# Patient Record
Sex: MALE | Race: WHITE | NOT HISPANIC OR LATINO | Employment: OTHER | URBAN - METROPOLITAN AREA
[De-identification: names, ages, dates, MRNs, and addresses within clinical notes are randomized per-mention and may not be internally consistent; named-entity substitution may affect disease eponyms.]

---

## 2023-05-18 LAB
ALANINE AMINOTRANSFERASE (SGPT) (U/L) IN SER/PLAS: 16 U/L (ref 10–52)
ALBUMIN (G/DL) IN SER/PLAS: 5 G/DL (ref 3.4–5)
ALBUMIN (MG/L) IN URINE: 81.4 MG/L
ALBUMIN/CREATININE (UG/MG) IN URINE: 147.5 UG/MG CRT (ref 0–30)
ALKALINE PHOSPHATASE (U/L) IN SER/PLAS: 63 U/L (ref 33–136)
ANION GAP IN SER/PLAS: 15 MMOL/L (ref 10–20)
ASPARTATE AMINOTRANSFERASE (SGOT) (U/L) IN SER/PLAS: 14 U/L (ref 9–39)
BILIRUBIN TOTAL (MG/DL) IN SER/PLAS: 0.5 MG/DL (ref 0–1.2)
CALCIUM (MG/DL) IN SER/PLAS: 10 MG/DL (ref 8.6–10.3)
CARBON DIOXIDE, TOTAL (MMOL/L) IN SER/PLAS: 26 MMOL/L (ref 21–32)
CHLORIDE (MMOL/L) IN SER/PLAS: 98 MMOL/L (ref 98–107)
CHOLESTEROL (MG/DL) IN SER/PLAS: 115 MG/DL (ref 0–199)
CHOLESTEROL IN HDL (MG/DL) IN SER/PLAS: 31.1 MG/DL
CHOLESTEROL/HDL RATIO: 3.7
CREATININE (MG/DL) IN SER/PLAS: 1.12 MG/DL (ref 0.5–1.3)
CREATININE (MG/DL) IN URINE: 55.2 MG/DL (ref 20–370)
ESTIMATED AVERAGE GLUCOSE FOR HBA1C: 226 MG/DL
GFR MALE: 68 ML/MIN/1.73M2
GLUCOSE (MG/DL) IN SER/PLAS: 262 MG/DL (ref 74–99)
HEMOGLOBIN A1C/HEMOGLOBIN TOTAL IN BLOOD: 9.5 %
LDL: 45 MG/DL (ref 0–99)
POTASSIUM (MMOL/L) IN SER/PLAS: 4.6 MMOL/L (ref 3.5–5.3)
PROSTATE SPECIFIC AG (NG/ML) IN SER/PLAS: 0.32 NG/ML (ref 0–4)
PROTEIN TOTAL: 7.8 G/DL (ref 6.4–8.2)
SODIUM (MMOL/L) IN SER/PLAS: 134 MMOL/L (ref 136–145)
TRIGLYCERIDE (MG/DL) IN SER/PLAS: 195 MG/DL (ref 0–149)
UREA NITROGEN (MG/DL) IN SER/PLAS: 28 MG/DL (ref 6–23)
VLDL: 39 MG/DL (ref 0–40)

## 2023-06-30 ENCOUNTER — OFFICE VISIT (OUTPATIENT)
Dept: PRIMARY CARE | Facility: CLINIC | Age: 78
End: 2023-06-30
Payer: MEDICARE

## 2023-06-30 VITALS
BODY MASS INDEX: 24.62 KG/M2 | WEIGHT: 172 LBS | OXYGEN SATURATION: 98 % | DIASTOLIC BLOOD PRESSURE: 72 MMHG | RESPIRATION RATE: 16 BRPM | HEIGHT: 70 IN | HEART RATE: 88 BPM | TEMPERATURE: 98.2 F | SYSTOLIC BLOOD PRESSURE: 140 MMHG

## 2023-06-30 DIAGNOSIS — I71.21 ANEURYSM OF ASCENDING AORTA WITHOUT RUPTURE (CMS-HCC): ICD-10-CM

## 2023-06-30 DIAGNOSIS — E55.9 VITAMIN D DEFICIENCY: ICD-10-CM

## 2023-06-30 DIAGNOSIS — Z00.00 HEALTHCARE MAINTENANCE: ICD-10-CM

## 2023-06-30 DIAGNOSIS — M79.605 LEG PAIN, CENTRAL, LEFT: ICD-10-CM

## 2023-06-30 DIAGNOSIS — E11.9 TYPE 2 DIABETES MELLITUS WITHOUT COMPLICATION, WITHOUT LONG-TERM CURRENT USE OF INSULIN (MULTI): ICD-10-CM

## 2023-06-30 DIAGNOSIS — E78.2 MIXED HYPERLIPIDEMIA: ICD-10-CM

## 2023-06-30 DIAGNOSIS — I10 PRIMARY HYPERTENSION: Primary | ICD-10-CM

## 2023-06-30 DIAGNOSIS — R63.4 WEIGHT LOSS: ICD-10-CM

## 2023-06-30 PROBLEM — J01.90 ACUTE SINUSITIS: Status: ACTIVE | Noted: 2023-06-30

## 2023-06-30 PROBLEM — S61.218A LACERATION OF FINGER, INDEX: Status: ACTIVE | Noted: 2023-06-30

## 2023-06-30 PROBLEM — J20.9 ACUTE BRONCHITIS: Status: ACTIVE | Noted: 2023-06-30

## 2023-06-30 PROBLEM — L57.0 ACTINIC KERATOSES: Status: ACTIVE | Noted: 2023-06-30

## 2023-06-30 PROBLEM — I25.2 STATUS POST MYOCARDIAL INFARCTION: Status: ACTIVE | Noted: 2023-06-30

## 2023-06-30 PROBLEM — R61 NIGHT SWEATS: Status: RESOLVED | Noted: 2023-06-30 | Resolved: 2023-06-30

## 2023-06-30 PROBLEM — J30.9 ALLERGIC RHINITIS: Status: ACTIVE | Noted: 2023-06-30

## 2023-06-30 PROBLEM — M79.674 CHRONIC PAIN OF TOE OF RIGHT FOOT: Status: RESOLVED | Noted: 2023-06-30 | Resolved: 2023-06-30

## 2023-06-30 PROBLEM — Z20.822 SUSPECTED COVID-19 VIRUS INFECTION: Status: RESOLVED | Noted: 2023-06-30 | Resolved: 2023-06-30

## 2023-06-30 PROBLEM — Z20.822 SUSPECTED COVID-19 VIRUS INFECTION: Status: ACTIVE | Noted: 2023-06-30

## 2023-06-30 PROBLEM — H90.3 ASYMMETRIC SNHL (SENSORINEURAL HEARING LOSS): Status: RESOLVED | Noted: 2023-06-30 | Resolved: 2023-06-30

## 2023-06-30 PROBLEM — Z95.1 S/P CABG (CORONARY ARTERY BYPASS GRAFT): Status: ACTIVE | Noted: 2023-06-30

## 2023-06-30 PROBLEM — R05.9 COUGH: Status: RESOLVED | Noted: 2023-06-30 | Resolved: 2023-06-30

## 2023-06-30 PROBLEM — R53.83 FATIGUE: Status: RESOLVED | Noted: 2023-06-30 | Resolved: 2023-06-30

## 2023-06-30 PROBLEM — J01.90 ACUTE SINUSITIS: Status: RESOLVED | Noted: 2023-06-30 | Resolved: 2023-06-30

## 2023-06-30 PROBLEM — D18.00 ANGIOMA: Status: ACTIVE | Noted: 2023-06-30

## 2023-06-30 PROBLEM — Z85.828 HISTORY OF SCC (SQUAMOUS CELL CARCINOMA) OF SKIN: Status: ACTIVE | Noted: 2023-06-30

## 2023-06-30 PROBLEM — R05.9 COUGH: Status: ACTIVE | Noted: 2023-06-30

## 2023-06-30 PROBLEM — G89.29 CHRONIC PAIN OF TOE OF RIGHT FOOT: Status: ACTIVE | Noted: 2023-06-30

## 2023-06-30 PROBLEM — G89.29 CHRONIC PAIN OF TOE OF LEFT FOOT: Status: ACTIVE | Noted: 2023-06-30

## 2023-06-30 PROBLEM — B35.3 TINEA PEDIS: Status: ACTIVE | Noted: 2023-06-30

## 2023-06-30 PROBLEM — M79.675 CHRONIC PAIN OF TOE OF LEFT FOOT: Status: RESOLVED | Noted: 2023-06-30 | Resolved: 2023-06-30

## 2023-06-30 PROBLEM — R06.2 WHEEZING ON AUSCULTATION: Status: RESOLVED | Noted: 2023-06-30 | Resolved: 2023-06-30

## 2023-06-30 PROBLEM — R00.0 TACHYCARDIA: Status: RESOLVED | Noted: 2023-06-30 | Resolved: 2023-06-30

## 2023-06-30 PROBLEM — R61 NIGHT SWEATS: Status: ACTIVE | Noted: 2023-06-30

## 2023-06-30 PROBLEM — D64.9 ANEMIA: Status: ACTIVE | Noted: 2023-06-30

## 2023-06-30 PROBLEM — B35.1 NAIL FUNGUS: Status: ACTIVE | Noted: 2023-06-30

## 2023-06-30 PROBLEM — E87.1 HYPONATREMIA: Status: ACTIVE | Noted: 2023-06-30

## 2023-06-30 PROBLEM — G89.29 CHRONIC PAIN OF TOE OF RIGHT FOOT: Status: RESOLVED | Noted: 2023-06-30 | Resolved: 2023-06-30

## 2023-06-30 PROBLEM — R00.0 TACHYCARDIA: Status: ACTIVE | Noted: 2023-06-30

## 2023-06-30 PROBLEM — I51.9 HEART DISEASE: Status: RESOLVED | Noted: 2023-06-30 | Resolved: 2023-06-30

## 2023-06-30 PROBLEM — R50.9 FEVER: Status: RESOLVED | Noted: 2023-06-30 | Resolved: 2023-06-30

## 2023-06-30 PROBLEM — R06.2 WHEEZING ON AUSCULTATION: Status: ACTIVE | Noted: 2023-06-30

## 2023-06-30 PROBLEM — M25.551 ACUTE PAIN OF RIGHT HIP: Status: RESOLVED | Noted: 2023-06-30 | Resolved: 2023-06-30

## 2023-06-30 PROBLEM — G89.29 CHRONIC PAIN OF TOE OF LEFT FOOT: Status: RESOLVED | Noted: 2023-06-30 | Resolved: 2023-06-30

## 2023-06-30 PROBLEM — S61.218A LACERATION OF FINGER, INDEX: Status: RESOLVED | Noted: 2023-06-30 | Resolved: 2023-06-30

## 2023-06-30 PROBLEM — R09.A2 FOREIGN BODY SENSATION IN THROAT: Status: ACTIVE | Noted: 2023-06-30

## 2023-06-30 PROBLEM — H90.3 BILATERAL SENSORINEURAL HEARING LOSS: Status: ACTIVE | Noted: 2023-06-30

## 2023-06-30 PROBLEM — D18.00 ANGIOMA: Status: RESOLVED | Noted: 2023-06-30 | Resolved: 2023-06-30

## 2023-06-30 PROBLEM — B35.1 NAIL FUNGUS: Status: RESOLVED | Noted: 2023-06-30 | Resolved: 2023-06-30

## 2023-06-30 PROBLEM — I25.10 CAD (CORONARY ARTERY DISEASE): Status: ACTIVE | Noted: 2023-06-30

## 2023-06-30 PROBLEM — R50.9 FEVER: Status: ACTIVE | Noted: 2023-06-30

## 2023-06-30 PROBLEM — R09.A2 FOREIGN BODY SENSATION IN THROAT: Status: RESOLVED | Noted: 2023-06-30 | Resolved: 2023-06-30

## 2023-06-30 PROBLEM — B36.8 TINEA INCOGNITO: Status: RESOLVED | Noted: 2023-06-30 | Resolved: 2023-06-30

## 2023-06-30 PROBLEM — M25.551 ACUTE PAIN OF RIGHT HIP: Status: ACTIVE | Noted: 2023-06-30

## 2023-06-30 PROBLEM — B35.9 DERMATOPHYTOSIS: Status: RESOLVED | Noted: 2023-06-30 | Resolved: 2023-06-30

## 2023-06-30 PROBLEM — M79.675 CHRONIC PAIN OF TOE OF LEFT FOOT: Status: ACTIVE | Noted: 2023-06-30

## 2023-06-30 PROBLEM — R21 RASH: Status: ACTIVE | Noted: 2023-06-30

## 2023-06-30 PROBLEM — B35.3 TINEA PEDIS: Status: RESOLVED | Noted: 2023-06-30 | Resolved: 2023-06-30

## 2023-06-30 PROBLEM — E87.1 HYPONATREMIA: Status: RESOLVED | Noted: 2023-06-30 | Resolved: 2023-06-30

## 2023-06-30 PROBLEM — R97.20 ELEVATED PSA: Status: RESOLVED | Noted: 2023-06-30 | Resolved: 2023-06-30

## 2023-06-30 PROBLEM — H90.3 ASYMMETRIC SNHL (SENSORINEURAL HEARING LOSS): Status: ACTIVE | Noted: 2023-06-30

## 2023-06-30 PROBLEM — L72.0 EPIDERMAL CYST: Status: ACTIVE | Noted: 2023-06-30

## 2023-06-30 PROBLEM — E78.5 HYPERLIPIDEMIA: Status: ACTIVE | Noted: 2023-06-30

## 2023-06-30 PROBLEM — L72.0 EPIDERMAL CYST: Status: RESOLVED | Noted: 2023-06-30 | Resolved: 2023-06-30

## 2023-06-30 PROBLEM — J20.9 ACUTE BRONCHITIS: Status: RESOLVED | Noted: 2023-06-30 | Resolved: 2023-06-30

## 2023-06-30 PROBLEM — R21 RASH: Status: RESOLVED | Noted: 2023-06-30 | Resolved: 2023-06-30

## 2023-06-30 PROBLEM — B36.8 TINEA INCOGNITO: Status: ACTIVE | Noted: 2023-06-30

## 2023-06-30 PROBLEM — R53.83 FATIGUE: Status: ACTIVE | Noted: 2023-06-30

## 2023-06-30 PROBLEM — I51.9 HEART DISEASE: Status: ACTIVE | Noted: 2023-06-30

## 2023-06-30 PROBLEM — R97.20 ELEVATED PSA: Status: ACTIVE | Noted: 2023-06-30

## 2023-06-30 PROBLEM — B35.9 DERMATOPHYTOSIS: Status: ACTIVE | Noted: 2023-06-30

## 2023-06-30 PROBLEM — L57.0 ACTINIC KERATOSES: Status: RESOLVED | Noted: 2023-06-30 | Resolved: 2023-06-30

## 2023-06-30 PROBLEM — M79.609 PAIN IN LIMB: Status: ACTIVE | Noted: 2023-06-30

## 2023-06-30 PROBLEM — M79.674 CHRONIC PAIN OF TOE OF RIGHT FOOT: Status: ACTIVE | Noted: 2023-06-30

## 2023-06-30 PROBLEM — J90 BILATERAL PLEURAL EFFUSION: Status: ACTIVE | Noted: 2023-06-30

## 2023-06-30 PROBLEM — M79.609 PAIN IN LIMB: Status: RESOLVED | Noted: 2023-06-30 | Resolved: 2023-06-30

## 2023-06-30 PROCEDURE — 3077F SYST BP >= 140 MM HG: CPT | Performed by: INTERNAL MEDICINE

## 2023-06-30 PROCEDURE — 1160F RVW MEDS BY RX/DR IN RCRD: CPT | Performed by: INTERNAL MEDICINE

## 2023-06-30 PROCEDURE — 99214 OFFICE O/P EST MOD 30 MIN: CPT | Performed by: INTERNAL MEDICINE

## 2023-06-30 PROCEDURE — 3078F DIAST BP <80 MM HG: CPT | Performed by: INTERNAL MEDICINE

## 2023-06-30 PROCEDURE — 1036F TOBACCO NON-USER: CPT | Performed by: INTERNAL MEDICINE

## 2023-06-30 PROCEDURE — 1159F MED LIST DOCD IN RCRD: CPT | Performed by: INTERNAL MEDICINE

## 2023-06-30 PROCEDURE — 1170F FXNL STATUS ASSESSED: CPT | Performed by: INTERNAL MEDICINE

## 2023-06-30 PROCEDURE — G0439 PPPS, SUBSEQ VISIT: HCPCS | Performed by: INTERNAL MEDICINE

## 2023-06-30 RX ORDER — ASPIRIN 81 MG/1
1 TABLET ORAL DAILY
COMMUNITY
Start: 2019-08-21

## 2023-06-30 RX ORDER — CHOLECALCIFEROL (VITAMIN D3) 25 MCG
1 TABLET ORAL DAILY
COMMUNITY
Start: 2019-08-21

## 2023-06-30 RX ORDER — ATORVASTATIN CALCIUM 10 MG/1
1 TABLET, FILM COATED ORAL DAILY
COMMUNITY
Start: 2019-08-21 | End: 2024-04-24

## 2023-06-30 RX ORDER — SITAGLIPTIN 100 MG/1
1 TABLET, FILM COATED ORAL DAILY
COMMUNITY
Start: 2019-08-21 | End: 2023-07-25 | Stop reason: SDUPTHER

## 2023-06-30 RX ORDER — METOPROLOL TARTRATE 25 MG/1
1 TABLET, FILM COATED ORAL 2 TIMES DAILY
COMMUNITY
Start: 2019-08-21

## 2023-06-30 RX ORDER — LANCETS 33 GAUGE
EACH MISCELLANEOUS
COMMUNITY
Start: 2023-01-13 | End: 2023-10-26 | Stop reason: SDUPTHER

## 2023-06-30 RX ORDER — GLIMEPIRIDE 1 MG/1
TABLET ORAL
COMMUNITY
Start: 2019-08-21 | End: 2023-12-06 | Stop reason: SDUPTHER

## 2023-06-30 RX ORDER — METFORMIN HYDROCHLORIDE 1000 MG/1
TABLET ORAL
COMMUNITY
Start: 2019-08-21 | End: 2023-07-25 | Stop reason: SDUPTHER

## 2023-06-30 RX ORDER — ALBUTEROL SULFATE 90 UG/1
AEROSOL, METERED RESPIRATORY (INHALATION)
COMMUNITY
Start: 2020-08-18 | End: 2023-12-06 | Stop reason: SDUPTHER

## 2023-06-30 RX ORDER — BLOOD-GLUCOSE METER
EACH MISCELLANEOUS
COMMUNITY
Start: 2020-04-09 | End: 2023-10-26 | Stop reason: SDUPTHER

## 2023-06-30 RX ORDER — LISINOPRIL 10 MG/1
TABLET ORAL
COMMUNITY
Start: 2019-08-21

## 2023-06-30 RX ORDER — PHENOL 1.4 %
1 AEROSOL, SPRAY (ML) MUCOUS MEMBRANE DAILY
COMMUNITY
Start: 2019-08-21

## 2023-06-30 RX ORDER — FLUTICASONE PROPIONATE 50 MCG
1 SPRAY, SUSPENSION (ML) NASAL DAILY
COMMUNITY
Start: 2019-08-21 | End: 2024-02-15

## 2023-06-30 RX ORDER — TIOTROPIUM BROMIDE INHALATION SPRAY 3.12 UG/1
SPRAY, METERED RESPIRATORY (INHALATION)
COMMUNITY
Start: 2019-08-21 | End: 2024-03-07

## 2023-06-30 RX ORDER — LANOLIN ALCOHOL/MO/W.PET/CERES
1 CREAM (GRAM) TOPICAL DAILY
COMMUNITY
Start: 2019-08-21

## 2023-06-30 ASSESSMENT — ENCOUNTER SYMPTOMS
DEPRESSION: 0
LOSS OF SENSATION IN FEET: 0
FATIGUE: 0
SHORTNESS OF BREATH: 0
OCCASIONAL FEELINGS OF UNSTEADINESS: 0
COUGH: 0
FEVER: 0

## 2023-06-30 ASSESSMENT — PATIENT HEALTH QUESTIONNAIRE - PHQ9
1. LITTLE INTEREST OR PLEASURE IN DOING THINGS: NOT AT ALL
2. FEELING DOWN, DEPRESSED OR HOPELESS: NOT AT ALL
SUM OF ALL RESPONSES TO PHQ9 QUESTIONS 1 AND 2: 0

## 2023-06-30 ASSESSMENT — ACTIVITIES OF DAILY LIVING (ADL)
GROCERY_SHOPPING: INDEPENDENT
MANAGING_FINANCES: INDEPENDENT
DOING_HOUSEWORK: INDEPENDENT
TAKING_MEDICATION: INDEPENDENT
BATHING: INDEPENDENT
DRESSING: INDEPENDENT

## 2023-06-30 ASSESSMENT — PAIN SCALES - GENERAL: PAINLEVEL: 4

## 2023-06-30 NOTE — PROGRESS NOTES
"Subjective   Reason for Visit: Bob Aceves is an 78 y.o. male here for a Medicare Wellness visit.     Past Medical, Surgical, and Family History reviewed and updated in chart.    Reviewed all medications by prescribing practitioner or clinical pharmacist (such as prescriptions, OTCs, herbal therapies and supplements) and documented in the medical record.    HPI  Influenza 2022  Covid 2021, 2022  PCV13 2018  PPSV23 2014  Shingrix 2020, 2021  Tdap 2016  Cologuard 2022  Adv Dir yes  Psa  5/23  Bmi 25  Eye exam 23  Fall risk 23  Depression screen 23    Concerned about weight loss  Jardiance was added  No diet changes  Appetite is ok  Cut down to half pill      C/o LLE stiffness in AM.  Causes difficulty w/transfers and ambulation.  Exercises daily, \"works itself out.\"  Stiffness w/rest.    Patient Care Team:  Lina Beltrán DO as PCP - General  Lina Beltrán DO as PCP - [employer] PCP     Review of Systems   Constitutional:  Negative for fatigue and fever.   Respiratory:  Negative for cough and shortness of breath.    Cardiovascular:  Negative for chest pain and leg swelling.   All other systems reviewed and are negative.      Objective   Vitals:  /72   Pulse 88   Temp 36.8 °C (98.2 °F)   Resp 16   Ht 1.765 m (5' 9.5\")   Wt 78 kg (172 lb)   SpO2 98%   BMI 25.04 kg/m²       Physical Exam  Vitals and nursing note reviewed.   Constitutional:       Appearance: Normal appearance.   HENT:      Head: Normocephalic.   Eyes:      Conjunctiva/sclera: Conjunctivae normal.      Pupils: Pupils are equal, round, and reactive to light.   Cardiovascular:      Rate and Rhythm: Normal rate and regular rhythm.      Pulses: Normal pulses.      Heart sounds: Normal heart sounds.   Pulmonary:      Effort: Pulmonary effort is normal.      Breath sounds: Normal breath sounds. No stridor.   Musculoskeletal:         General: No swelling.      Cervical back: Neck supple.   Skin:     General: Skin is warm and " dry.   Neurological:      General: No focal deficit present.      Mental Status: He is oriented to person, place, and time.         Assessment/Plan   Problem List Items Addressed This Visit       Ascending aortic aneurysm (CMS/HCC)    Diabetes mellitus (CMS/HCC)    Relevant Orders    CBC    Comprehensive Metabolic Panel    C-Peptide    Hypertension - Primary    Hyperlipidemia    Vitamin D deficiency    Relevant Orders    Vitamin B12    Vitamin D, Total     Other Visit Diagnoses       Healthcare maintenance        Leg pain, central, left        Relevant Orders    Lower extremity venous duplex left    Weight loss        Relevant Orders    Thyroid Stimulating Hormone    Sedimentation Rate    C-reactive protein          Update preventive  Cont meds  Check labs  Stable based on symptoms and exam.  Continue established treatment plan and follow up at least yearly.  Monitor weight  1/2 jardiance  Check sugars at different times of day  Check cpeptide   May need to be started on insulin

## 2023-07-03 ENCOUNTER — LAB (OUTPATIENT)
Dept: LAB | Facility: LAB | Age: 78
End: 2023-07-03
Payer: MEDICARE

## 2023-07-03 DIAGNOSIS — R63.4 WEIGHT LOSS: ICD-10-CM

## 2023-07-03 DIAGNOSIS — E11.9 TYPE 2 DIABETES MELLITUS WITHOUT COMPLICATION, WITHOUT LONG-TERM CURRENT USE OF INSULIN (MULTI): ICD-10-CM

## 2023-07-03 DIAGNOSIS — E55.9 VITAMIN D DEFICIENCY: ICD-10-CM

## 2023-07-03 LAB — C PEPTIDE (NG/ML) IN SER/PLAS: 3.9 NG/ML (ref 0.7–3.9)

## 2023-07-03 PROCEDURE — 84443 ASSAY THYROID STIM HORMONE: CPT

## 2023-07-03 PROCEDURE — 85652 RBC SED RATE AUTOMATED: CPT

## 2023-07-03 PROCEDURE — 85027 COMPLETE CBC AUTOMATED: CPT

## 2023-07-03 PROCEDURE — 86140 C-REACTIVE PROTEIN: CPT

## 2023-07-03 PROCEDURE — 84681 ASSAY OF C-PEPTIDE: CPT

## 2023-07-03 PROCEDURE — 82306 VITAMIN D 25 HYDROXY: CPT

## 2023-07-03 PROCEDURE — 82607 VITAMIN B-12: CPT

## 2023-07-03 PROCEDURE — 36415 COLL VENOUS BLD VENIPUNCTURE: CPT

## 2023-07-03 PROCEDURE — 80053 COMPREHEN METABOLIC PANEL: CPT

## 2023-07-04 LAB
ALANINE AMINOTRANSFERASE (SGPT) (U/L) IN SER/PLAS: 15 U/L (ref 10–52)
ALBUMIN (G/DL) IN SER/PLAS: 4.7 G/DL (ref 3.4–5)
ALKALINE PHOSPHATASE (U/L) IN SER/PLAS: 62 U/L (ref 33–136)
ANION GAP IN SER/PLAS: 16 MMOL/L (ref 10–20)
ASPARTATE AMINOTRANSFERASE (SGOT) (U/L) IN SER/PLAS: 13 U/L (ref 9–39)
BILIRUBIN TOTAL (MG/DL) IN SER/PLAS: 0.6 MG/DL (ref 0–1.2)
C REACTIVE PROTEIN (MG/L) IN SER/PLAS: <0.1 MG/DL
CALCIDIOL (25 OH VITAMIN D3) (NG/ML) IN SER/PLAS: 64 NG/ML
CALCIUM (MG/DL) IN SER/PLAS: 9.9 MG/DL (ref 8.6–10.3)
CARBON DIOXIDE, TOTAL (MMOL/L) IN SER/PLAS: 25 MMOL/L (ref 21–32)
CHLORIDE (MMOL/L) IN SER/PLAS: 97 MMOL/L (ref 98–107)
COBALAMIN (VITAMIN B12) (PG/ML) IN SER/PLAS: 2796 PG/ML (ref 211–911)
CREATININE (MG/DL) IN SER/PLAS: 1.22 MG/DL (ref 0.5–1.3)
ERYTHROCYTE DISTRIBUTION WIDTH (RATIO) BY AUTOMATED COUNT: 12.8 % (ref 11.5–14.5)
ERYTHROCYTE MEAN CORPUSCULAR HEMOGLOBIN CONCENTRATION (G/DL) BY AUTOMATED: 34.2 G/DL (ref 32–36)
ERYTHROCYTE MEAN CORPUSCULAR VOLUME (FL) BY AUTOMATED COUNT: 96 FL (ref 80–100)
ERYTHROCYTES (10*6/UL) IN BLOOD BY AUTOMATED COUNT: 4.21 X10E12/L (ref 4.5–5.9)
GFR MALE: 61 ML/MIN/1.73M2
GLUCOSE (MG/DL) IN SER/PLAS: 218 MG/DL (ref 74–99)
HEMATOCRIT (%) IN BLOOD BY AUTOMATED COUNT: 40.4 % (ref 41–52)
HEMOGLOBIN (G/DL) IN BLOOD: 13.8 G/DL (ref 13.5–17.5)
LEUKOCYTES (10*3/UL) IN BLOOD BY AUTOMATED COUNT: 8 X10E9/L (ref 4.4–11.3)
PLATELETS (10*3/UL) IN BLOOD AUTOMATED COUNT: 182 X10E9/L (ref 150–450)
POTASSIUM (MMOL/L) IN SER/PLAS: 5 MMOL/L (ref 3.5–5.3)
PROTEIN TOTAL: 7 G/DL (ref 6.4–8.2)
SEDIMENTATION RATE, ERYTHROCYTE: 7 MM/H (ref 0–20)
SODIUM (MMOL/L) IN SER/PLAS: 133 MMOL/L (ref 136–145)
THYROTROPIN (MIU/L) IN SER/PLAS BY DETECTION LIMIT <= 0.05 MIU/L: 0.9 MIU/L (ref 0.44–3.98)
UREA NITROGEN (MG/DL) IN SER/PLAS: 27 MG/DL (ref 6–23)

## 2023-07-05 ENCOUNTER — TELEPHONE (OUTPATIENT)
Dept: PRIMARY CARE | Facility: CLINIC | Age: 78
End: 2023-07-05
Payer: MEDICARE

## 2023-07-07 ENCOUNTER — TELEPHONE (OUTPATIENT)
Dept: PRIMARY CARE | Facility: CLINIC | Age: 78
End: 2023-07-07
Payer: MEDICARE

## 2023-07-07 NOTE — RESULT ENCOUNTER NOTE
Call patient his labs look good  Sugars are high causing mild dehydration  Work on fluids  His pancreas is making insulin but  I am concerned if we can't get his numbers down that we   May need to add a little insulin because we dont want him to lose anymore weight  Will discuss in detain at his follow up   How have his numbers been doing  We can move up follow up in the the next month if needed if he is running very high

## 2023-07-07 NOTE — TELEPHONE ENCOUNTER
----- Message from Lina Beltrán, DO sent at 7/7/2023 11:41 AM EDT -----  Call patient his labs look good  Sugars are high causing mild dehydration  Work on fluids  His pancreas is making insulin but  I am concerned if we can't get his numbers down that we   May need to add a little insulin because we dont want him to lose anymore weight  Will discuss in detain at his follow up   How have his numbers been doing  We can move up follow up in the the next month if needed if he is running very high

## 2023-07-07 NOTE — TELEPHONE ENCOUNTER
Pt made aware and voiced understanding.  Stated glucose still high.  Running 150-200.  Has appt w/you 8/11/23.  Do you want to see him in the next few weeks?    Also,  pt questioned what the stiffness in his legs is r/t since venous duplex was negative. Please advise.

## 2023-07-20 ENCOUNTER — PATIENT OUTREACH (OUTPATIENT)
Dept: CARE COORDINATION | Facility: CLINIC | Age: 78
End: 2023-07-20
Payer: MEDICARE

## 2023-07-20 NOTE — PROGRESS NOTES
Left message on patient's phone with these details.     Hello. My name is Nickie. I am the Patient Navigator with [facility] that works with Dr. Beltrán's   office.     I am following up from your recent visit with your PCP to make sure you do not have any further questions or need any further assistance.    I am here to help guide you through our healthcare system and help with any obstacles that are in the way of you receiving the proper care. I am able to assist with appointments, medication coverage issues, community programs which can include help with meals, medical supplies, senior programs and housing issues.     We are here to help get you connected with the support you might need for your overall health. If you do my assistance or have questions please contact me at [phone]. This is my direct number and you can feel free to leave a message if I do not answer and I will call you back at my earliest convenience.     Contacting patient to Review [employer] Patient Experience Questionnaire.

## 2023-07-25 ENCOUNTER — OFFICE VISIT (OUTPATIENT)
Dept: PRIMARY CARE | Facility: CLINIC | Age: 78
End: 2023-07-25
Payer: MEDICARE

## 2023-07-25 VITALS
RESPIRATION RATE: 16 BRPM | HEIGHT: 70 IN | SYSTOLIC BLOOD PRESSURE: 120 MMHG | HEART RATE: 75 BPM | BODY MASS INDEX: 24.34 KG/M2 | OXYGEN SATURATION: 97 % | WEIGHT: 170 LBS | DIASTOLIC BLOOD PRESSURE: 68 MMHG | TEMPERATURE: 97.5 F

## 2023-07-25 DIAGNOSIS — E11.9 TYPE 2 DIABETES MELLITUS WITHOUT COMPLICATION, WITH LONG-TERM CURRENT USE OF INSULIN (MULTI): Primary | ICD-10-CM

## 2023-07-25 DIAGNOSIS — E11.9 TYPE 2 DIABETES MELLITUS WITHOUT COMPLICATION, UNSPECIFIED WHETHER LONG TERM INSULIN USE (MULTI): ICD-10-CM

## 2023-07-25 DIAGNOSIS — I10 PRIMARY HYPERTENSION: ICD-10-CM

## 2023-07-25 DIAGNOSIS — R63.4 WEIGHT LOSS: ICD-10-CM

## 2023-07-25 DIAGNOSIS — E78.2 MIXED HYPERLIPIDEMIA: ICD-10-CM

## 2023-07-25 DIAGNOSIS — M79.605 LEG PAIN, CENTRAL, LEFT: ICD-10-CM

## 2023-07-25 DIAGNOSIS — Z79.4 TYPE 2 DIABETES MELLITUS WITHOUT COMPLICATION, WITH LONG-TERM CURRENT USE OF INSULIN (MULTI): Primary | ICD-10-CM

## 2023-07-25 PROCEDURE — 3074F SYST BP LT 130 MM HG: CPT | Performed by: INTERNAL MEDICINE

## 2023-07-25 PROCEDURE — 99214 OFFICE O/P EST MOD 30 MIN: CPT | Performed by: INTERNAL MEDICINE

## 2023-07-25 PROCEDURE — 1160F RVW MEDS BY RX/DR IN RCRD: CPT | Performed by: INTERNAL MEDICINE

## 2023-07-25 PROCEDURE — 3078F DIAST BP <80 MM HG: CPT | Performed by: INTERNAL MEDICINE

## 2023-07-25 PROCEDURE — 1125F AMNT PAIN NOTED PAIN PRSNT: CPT | Performed by: INTERNAL MEDICINE

## 2023-07-25 PROCEDURE — 1036F TOBACCO NON-USER: CPT | Performed by: INTERNAL MEDICINE

## 2023-07-25 PROCEDURE — 1159F MED LIST DOCD IN RCRD: CPT | Performed by: INTERNAL MEDICINE

## 2023-07-25 RX ORDER — INSULIN GLARGINE 100 [IU]/ML
3 INJECTION, SOLUTION SUBCUTANEOUS NIGHTLY
Qty: 3 ML | Refills: 3 | Status: SHIPPED | OUTPATIENT
Start: 2023-07-25 | End: 2023-08-25 | Stop reason: SDUPTHER

## 2023-07-25 RX ORDER — METFORMIN HYDROCHLORIDE 1000 MG/1
TABLET ORAL
Qty: 180 TABLET | Refills: 3 | Status: SHIPPED | OUTPATIENT
Start: 2023-07-25 | End: 2024-04-23

## 2023-07-25 RX ORDER — PEN NEEDLE, DIABETIC 30 GX3/16"
NEEDLE, DISPOSABLE MISCELLANEOUS
Qty: 100 EACH | Refills: 11 | Status: SHIPPED | OUTPATIENT
Start: 2023-07-25 | End: 2024-07-24

## 2023-07-25 ASSESSMENT — ENCOUNTER SYMPTOMS
FEVER: 0
SHORTNESS OF BREATH: 0
FATIGUE: 0
COUGH: 0

## 2023-07-25 ASSESSMENT — PATIENT HEALTH QUESTIONNAIRE - PHQ9
2. FEELING DOWN, DEPRESSED OR HOPELESS: NOT AT ALL
SUM OF ALL RESPONSES TO PHQ9 QUESTIONS 1 AND 2: 0
1. LITTLE INTEREST OR PLEASURE IN DOING THINGS: NOT AT ALL

## 2023-07-25 NOTE — PROGRESS NOTES
"Subjective   Bob Aceves is a 78 y.o. male who presents for 6 week Diabetes follow up.    HPI   Monitoring glucose daily.  Alternating times.  Ave: 150-200  Limiting sugars and carbohydrates.  Denies adverse sx's r/t DM.  Continues w/soreness to LLE in AM, improves w/movement/exercise.    Review of Systems   Constitutional:  Negative for fatigue and fever.   Respiratory:  Negative for cough and shortness of breath.    Cardiovascular:  Negative for chest pain and leg swelling.   All other systems reviewed and are negative.      Health Maintenance Due   Topic Date Due    Diabetes: Foot Exam  Never done    Diabetes: Retinopathy Screening  Never done    Hepatitis C Screening  Never done    COVID-19 Vaccine (4 - Booster for Pfizer series) 11/08/2022    Diabetes: Hemoglobin A1C  08/18/2023       Objective   /68   Pulse 75   Temp 36.4 °C (97.5 °F)   Resp 16   Ht 1.765 m (5' 9.5\")   Wt 77.1 kg (170 lb)   SpO2 97%   BMI 24.74 kg/m²     Physical Exam  Vitals and nursing note reviewed.   Constitutional:       Appearance: Normal appearance.   HENT:      Head: Normocephalic.   Eyes:      Conjunctiva/sclera: Conjunctivae normal.      Pupils: Pupils are equal, round, and reactive to light.   Cardiovascular:      Rate and Rhythm: Normal rate and regular rhythm.      Pulses: Normal pulses.      Heart sounds: Normal heart sounds.   Pulmonary:      Effort: Pulmonary effort is normal.      Breath sounds: Normal breath sounds.   Musculoskeletal:         General: No swelling.      Cervical back: Neck supple.   Skin:     General: Skin is warm and dry.   Neurological:      General: No focal deficit present.      Mental Status: He is oriented to person, place, and time.         Assessment/Plan   Problem List Items Addressed This Visit       Diabetes mellitus (CMS/[facility]) - Primary    Relevant Medications    metFORMIN (Glucophage) 1,000 mg tablet    SITagliptin phosphate (Januvia) 100 mg tablet    insulin glargine (Lantus Solostar " "U-100 Insulin) 100 unit/mL (3 mL) pen    pen needle, diabetic 31 gauge x 5/16\" needle    Other Relevant Orders    Lipid Panel    CBC    Comprehensive Metabolic Panel    Hemoglobin A1C    Vitamin B12    Hypertension    Hyperlipidemia     Other Visit Diagnoses       Weight loss        Leg pain, central, left              Will start lantus at low dose  3 units   Daily   Call in 2 weeks with sugars for adjustments  Answered questions  Call if problems  Fu in 6 weeks  Monitor weight   Stable based on symptoms and exam.  Continue established treatment plan and follow up at least yearly.         "

## 2023-08-10 ENCOUNTER — TELEPHONE (OUTPATIENT)
Dept: PRIMARY CARE | Facility: CLINIC | Age: 78
End: 2023-08-10
Payer: MEDICARE

## 2023-08-11 ENCOUNTER — APPOINTMENT (OUTPATIENT)
Dept: PRIMARY CARE | Facility: CLINIC | Age: 78
End: 2023-08-11
Payer: MEDICARE

## 2023-08-11 NOTE — TELEPHONE ENCOUNTER
Pt made aware to increase insulin to 6 units daily and call back in 2 weeks w/update.  (See scanned doc).

## 2023-08-25 ENCOUNTER — TELEPHONE (OUTPATIENT)
Dept: PRIMARY CARE | Facility: CLINIC | Age: 78
End: 2023-08-25
Payer: MEDICARE

## 2023-08-25 DIAGNOSIS — E11.9 TYPE 2 DIABETES MELLITUS WITHOUT COMPLICATION, WITH LONG-TERM CURRENT USE OF INSULIN (MULTI): Primary | ICD-10-CM

## 2023-08-25 DIAGNOSIS — Z79.4 TYPE 2 DIABETES MELLITUS WITHOUT COMPLICATION, WITH LONG-TERM CURRENT USE OF INSULIN (MULTI): Primary | ICD-10-CM

## 2023-08-25 RX ORDER — INSULIN GLARGINE 100 [IU]/ML
6 INJECTION, SOLUTION SUBCUTANEOUS NIGHTLY
Qty: 1.8 ML | Refills: 11 | Status: SHIPPED | OUTPATIENT
Start: 2023-08-25 | End: 2024-08-24

## 2023-08-25 NOTE — TELEPHONE ENCOUNTER
Patient lm,     Insulin units were increased.     Needs a new pens. Running out.     Please update sig to 6 units

## 2023-08-29 ENCOUNTER — LAB (OUTPATIENT)
Dept: LAB | Facility: LAB | Age: 78
End: 2023-08-29
Payer: MEDICARE

## 2023-08-29 DIAGNOSIS — E11.9 TYPE 2 DIABETES MELLITUS WITHOUT COMPLICATION, UNSPECIFIED WHETHER LONG TERM INSULIN USE (MULTI): ICD-10-CM

## 2023-08-29 LAB
ALANINE AMINOTRANSFERASE (SGPT) (U/L) IN SER/PLAS: 13 U/L (ref 10–52)
ALBUMIN (G/DL) IN SER/PLAS: 4.6 G/DL (ref 3.4–5)
ALKALINE PHOSPHATASE (U/L) IN SER/PLAS: 61 U/L (ref 33–136)
ANION GAP IN SER/PLAS: 15 MMOL/L (ref 10–20)
ASPARTATE AMINOTRANSFERASE (SGOT) (U/L) IN SER/PLAS: 12 U/L (ref 9–39)
BILIRUBIN TOTAL (MG/DL) IN SER/PLAS: 0.5 MG/DL (ref 0–1.2)
CALCIUM (MG/DL) IN SER/PLAS: 9.9 MG/DL (ref 8.6–10.3)
CARBON DIOXIDE, TOTAL (MMOL/L) IN SER/PLAS: 27 MMOL/L (ref 21–32)
CHLORIDE (MMOL/L) IN SER/PLAS: 101 MMOL/L (ref 98–107)
CHOLESTEROL (MG/DL) IN SER/PLAS: 100 MG/DL (ref 0–199)
CHOLESTEROL IN HDL (MG/DL) IN SER/PLAS: 24.7 MG/DL
CHOLESTEROL/HDL RATIO: 4
COBALAMIN (VITAMIN B12) (PG/ML) IN SER/PLAS: 2394 PG/ML (ref 211–911)
CREATININE (MG/DL) IN SER/PLAS: 1.18 MG/DL (ref 0.5–1.3)
ERYTHROCYTE DISTRIBUTION WIDTH (RATIO) BY AUTOMATED COUNT: 12.6 % (ref 11.5–14.5)
ERYTHROCYTE MEAN CORPUSCULAR HEMOGLOBIN CONCENTRATION (G/DL) BY AUTOMATED: 33.6 G/DL (ref 32–36)
ERYTHROCYTE MEAN CORPUSCULAR VOLUME (FL) BY AUTOMATED COUNT: 97 FL (ref 80–100)
ERYTHROCYTES (10*6/UL) IN BLOOD BY AUTOMATED COUNT: 4.19 X10E12/L (ref 4.5–5.9)
ESTIMATED AVERAGE GLUCOSE FOR HBA1C: 232 MG/DL
GFR MALE: 64 ML/MIN/1.73M2
GLUCOSE (MG/DL) IN SER/PLAS: 155 MG/DL (ref 74–99)
HEMATOCRIT (%) IN BLOOD BY AUTOMATED COUNT: 40.5 % (ref 41–52)
HEMOGLOBIN (G/DL) IN BLOOD: 13.6 G/DL (ref 13.5–17.5)
HEMOGLOBIN A1C/HEMOGLOBIN TOTAL IN BLOOD: 9.7 %
LDL: 43 MG/DL (ref 0–99)
LEUKOCYTES (10*3/UL) IN BLOOD BY AUTOMATED COUNT: 7 X10E9/L (ref 4.4–11.3)
PLATELETS (10*3/UL) IN BLOOD AUTOMATED COUNT: 187 X10E9/L (ref 150–450)
POTASSIUM (MMOL/L) IN SER/PLAS: 4.6 MMOL/L (ref 3.5–5.3)
PROTEIN TOTAL: 6.9 G/DL (ref 6.4–8.2)
SODIUM (MMOL/L) IN SER/PLAS: 138 MMOL/L (ref 136–145)
TRIGLYCERIDE (MG/DL) IN SER/PLAS: 161 MG/DL (ref 0–149)
UREA NITROGEN (MG/DL) IN SER/PLAS: 33 MG/DL (ref 6–23)
VLDL: 32 MG/DL (ref 0–40)

## 2023-08-29 PROCEDURE — 83036 HEMOGLOBIN GLYCOSYLATED A1C: CPT

## 2023-08-29 PROCEDURE — 82607 VITAMIN B-12: CPT

## 2023-08-29 PROCEDURE — 85027 COMPLETE CBC AUTOMATED: CPT

## 2023-08-29 PROCEDURE — 80061 LIPID PANEL: CPT

## 2023-08-29 PROCEDURE — 36415 COLL VENOUS BLD VENIPUNCTURE: CPT

## 2023-08-29 PROCEDURE — 80053 COMPREHEN METABOLIC PANEL: CPT

## 2023-09-05 ENCOUNTER — OFFICE VISIT (OUTPATIENT)
Dept: PRIMARY CARE | Facility: CLINIC | Age: 78
End: 2023-09-05
Payer: MEDICARE

## 2023-09-05 VITALS
BODY MASS INDEX: 24.48 KG/M2 | DIASTOLIC BLOOD PRESSURE: 62 MMHG | HEIGHT: 70 IN | HEART RATE: 80 BPM | SYSTOLIC BLOOD PRESSURE: 108 MMHG | RESPIRATION RATE: 16 BRPM | TEMPERATURE: 98.1 F | OXYGEN SATURATION: 100 % | WEIGHT: 171 LBS

## 2023-09-05 DIAGNOSIS — E11.9 TYPE 2 DIABETES MELLITUS WITHOUT COMPLICATION, UNSPECIFIED WHETHER LONG TERM INSULIN USE (MULTI): Primary | ICD-10-CM

## 2023-09-05 DIAGNOSIS — E78.2 MIXED HYPERLIPIDEMIA: ICD-10-CM

## 2023-09-05 PROBLEM — G89.29 CHRONIC PAIN OF TOE: Status: ACTIVE | Noted: 2023-09-05

## 2023-09-05 PROBLEM — L72.0 EPIDERMAL CYST: Status: ACTIVE | Noted: 2023-09-05

## 2023-09-05 PROBLEM — H90.3 ASYMMETRIC SNHL (SENSORINEURAL HEARING LOSS): Status: ACTIVE | Noted: 2023-09-05

## 2023-09-05 PROBLEM — R09.A2 FOREIGN BODY SENSATION IN THROAT: Status: ACTIVE | Noted: 2023-09-05

## 2023-09-05 PROBLEM — D18.00 ANGIOMA: Status: ACTIVE | Noted: 2023-09-05

## 2023-09-05 PROBLEM — M79.676 CHRONIC PAIN OF TOE: Status: ACTIVE | Noted: 2023-09-05

## 2023-09-05 PROBLEM — B35.9 DERMATOPHYTOSIS: Status: ACTIVE | Noted: 2023-09-05

## 2023-09-05 PROBLEM — R61 NIGHT SWEATS: Status: ACTIVE | Noted: 2023-09-05

## 2023-09-05 PROBLEM — B36.8 TINEA INCOGNITO: Status: ACTIVE | Noted: 2023-09-05

## 2023-09-05 PROBLEM — L57.0 ACTINIC KERATOSES: Status: ACTIVE | Noted: 2023-09-05

## 2023-09-05 PROBLEM — I51.9 HEART DISEASE: Status: ACTIVE | Noted: 2023-09-05

## 2023-09-05 PROBLEM — R06.2 WHEEZING ON AUSCULTATION: Status: ACTIVE | Noted: 2023-09-05

## 2023-09-05 PROBLEM — B35.1 NAIL FUNGUS: Status: ACTIVE | Noted: 2023-09-05

## 2023-09-05 PROBLEM — E87.1 HYPONATREMIA: Status: ACTIVE | Noted: 2023-09-05

## 2023-09-05 PROBLEM — S61.218A LACERATION OF FINGER, INDEX: Status: ACTIVE | Noted: 2023-09-05

## 2023-09-05 PROBLEM — R53.83 FATIGUE: Status: ACTIVE | Noted: 2023-09-05

## 2023-09-05 PROBLEM — R00.0 TACHYCARDIA: Status: ACTIVE | Noted: 2023-09-05

## 2023-09-05 PROBLEM — B35.3 TINEA PEDIS: Status: ACTIVE | Noted: 2023-09-05

## 2023-09-05 PROBLEM — R97.20 ELEVATED PSA: Status: ACTIVE | Noted: 2023-09-05

## 2023-09-05 PROBLEM — M79.609 PAIN IN LIMB: Status: ACTIVE | Noted: 2023-09-05

## 2023-09-05 PROCEDURE — 1036F TOBACCO NON-USER: CPT | Performed by: INTERNAL MEDICINE

## 2023-09-05 PROCEDURE — 1125F AMNT PAIN NOTED PAIN PRSNT: CPT | Performed by: INTERNAL MEDICINE

## 2023-09-05 PROCEDURE — 3074F SYST BP LT 130 MM HG: CPT | Performed by: INTERNAL MEDICINE

## 2023-09-05 PROCEDURE — 99214 OFFICE O/P EST MOD 30 MIN: CPT | Performed by: INTERNAL MEDICINE

## 2023-09-05 PROCEDURE — 1160F RVW MEDS BY RX/DR IN RCRD: CPT | Performed by: INTERNAL MEDICINE

## 2023-09-05 PROCEDURE — 3078F DIAST BP <80 MM HG: CPT | Performed by: INTERNAL MEDICINE

## 2023-09-05 PROCEDURE — 1159F MED LIST DOCD IN RCRD: CPT | Performed by: INTERNAL MEDICINE

## 2023-09-05 RX ORDER — GLUCOSAM/CHONDRO/HERB 149/HYAL 750-100 MG
1 TABLET ORAL DAILY
COMMUNITY
End: 2023-12-06 | Stop reason: ALTCHOICE

## 2023-09-05 RX ORDER — IBUPROFEN 100 MG/5ML
1 SUSPENSION, ORAL (FINAL DOSE FORM) ORAL DAILY
COMMUNITY

## 2023-09-05 ASSESSMENT — ENCOUNTER SYMPTOMS
SHORTNESS OF BREATH: 0
FEVER: 0
COUGH: 0
FATIGUE: 0

## 2023-09-05 NOTE — PROGRESS NOTES
"Subjective   Bob Aceves is a 78 y.o. male who presents for Diabetes follow up.    HPI   Monitoring glucose TID.  Ave: 130-170.  C/o neuropathy pain to LLE.  Denies polyuria, polydipsia.  Follows Diabetic diet.  Stays active.  Taking meds as Rx'd.    Review of Systems   Constitutional:  Negative for fatigue and fever.   Respiratory:  Negative for cough and shortness of breath.    Cardiovascular:  Negative for chest pain and leg swelling.   All other systems reviewed and are negative.      Health Maintenance Due   Topic Date Due    Diabetes: Foot Exam  Never done    Diabetes: Retinopathy Screening  Never done    Hepatitis C Screening  Never done    COVID-19 Vaccine (4 - Pfizer series) 11/08/2022    Influenza Vaccine (1) 09/01/2023       Objective   /62   Pulse 80   Temp 36.7 °C (98.1 °F)   Resp 16   Ht 1.765 m (5' 9.5\")   Wt 77.6 kg (171 lb)   SpO2 100%   BMI 24.89 kg/m²     Physical Exam  Vitals and nursing note reviewed.   Constitutional:       Appearance: Normal appearance.   HENT:      Head: Normocephalic.   Eyes:      Conjunctiva/sclera: Conjunctivae normal.      Pupils: Pupils are equal, round, and reactive to light.   Cardiovascular:      Rate and Rhythm: Normal rate and regular rhythm.      Pulses: Normal pulses.      Heart sounds: Normal heart sounds.   Pulmonary:      Effort: Pulmonary effort is normal.      Breath sounds: Normal breath sounds.   Musculoskeletal:         General: No swelling.      Cervical back: Neck supple.   Skin:     General: Skin is warm and dry.   Neurological:      General: No focal deficit present.      Mental Status: He is oriented to person, place, and time.         Assessment/Plan   Problem List Items Addressed This Visit       Diabetes mellitus (CMS/HCC) - Primary    Relevant Orders    Lipid Panel    CBC    Comprehensive Metabolic Panel    Hemoglobin A1C    Vitamin B12    Hyperlipidemia     Reviewed results  Cont meds  Stable based on symptoms and exam.  Continue " established treatment plan and follow up at least yearly.  Reviewed sugars change glim to lunch and dinner   Dominic bring sugars in 2 weeks   Add insulin slowly

## 2023-09-11 ENCOUNTER — TELEPHONE (OUTPATIENT)
Dept: PHARMACY | Facility: HOSPITAL | Age: 78
End: 2023-09-11
Payer: MEDICARE

## 2023-09-18 NOTE — TELEPHONE ENCOUNTER
I reviewed the progress note and agree with the resident’s findings and plans as written. Case discussed with resident.    Praful Fulton, PharmD

## 2023-09-22 ENCOUNTER — TELEPHONE (OUTPATIENT)
Dept: PHARMACY | Facility: HOSPITAL | Age: 78
End: 2023-09-22
Payer: MEDICARE

## 2023-09-22 ENCOUNTER — TELEPHONE (OUTPATIENT)
Dept: PRIMARY CARE | Facility: CLINIC | Age: 78
End: 2023-09-22
Payer: MEDICARE

## 2023-09-22 NOTE — TELEPHONE ENCOUNTER
Pt made aware of Dr. Beltrán's recommendations regarding glucose and RSV vaccine.  (See scanned doc).

## 2023-09-22 NOTE — TELEPHONE ENCOUNTER
Population Health: Outreach by Ambulatory Pharmacy Team    Payor: [employer] [place]  Reason: Adherence  Medication: Lisinopril 10 mg tablet   Outcome: Left Voicemail    RAQUEL CHEYENNE ARNETT, BrettD Resident

## 2023-10-25 DIAGNOSIS — E11.9 DM TYPE 2 WITHOUT RETINOPATHY (MULTI): Primary | ICD-10-CM

## 2023-10-26 DIAGNOSIS — E11.9 DM TYPE 2 WITHOUT RETINOPATHY (MULTI): ICD-10-CM

## 2023-10-26 RX ORDER — BLOOD-GLUCOSE METER
EACH MISCELLANEOUS
Qty: 100 EACH | Refills: 3 | Status: SHIPPED | OUTPATIENT
Start: 2023-10-26 | End: 2023-11-01 | Stop reason: SDUPTHER

## 2023-10-26 RX ORDER — BLOOD-GLUCOSE CONTROL, NORMAL
EACH MISCELLANEOUS
Qty: 100 EACH | Refills: 3 | Status: SHIPPED | OUTPATIENT
Start: 2023-10-26 | End: 2024-01-08

## 2023-10-26 RX ORDER — DEXTROSE 4 G
TABLET,CHEWABLE ORAL
Qty: 1 EACH | Refills: 0 | Status: SHIPPED | OUTPATIENT
Start: 2023-10-26

## 2023-11-01 DIAGNOSIS — E11.9 DM TYPE 2 WITHOUT RETINOPATHY (MULTI): ICD-10-CM

## 2023-11-01 RX ORDER — BLOOD-GLUCOSE METER
EACH MISCELLANEOUS
Qty: 300 EACH | Refills: 3 | Status: SHIPPED | OUTPATIENT
Start: 2023-11-01

## 2023-11-30 ENCOUNTER — LAB (OUTPATIENT)
Dept: LAB | Facility: LAB | Age: 78
End: 2023-11-30
Payer: MEDICARE

## 2023-11-30 DIAGNOSIS — C61 MALIGNANT NEOPLASM OF PROSTATE (MULTI): Primary | ICD-10-CM

## 2023-11-30 DIAGNOSIS — E11.9 TYPE 2 DIABETES MELLITUS WITHOUT COMPLICATION, UNSPECIFIED WHETHER LONG TERM INSULIN USE (MULTI): ICD-10-CM

## 2023-11-30 LAB
ALBUMIN SERPL BCP-MCNC: 4.7 G/DL (ref 3.4–5)
ALP SERPL-CCNC: 69 U/L (ref 33–136)
ALT SERPL W P-5'-P-CCNC: 11 U/L (ref 10–52)
ANION GAP SERPL CALC-SCNC: 14 MMOL/L (ref 10–20)
AST SERPL W P-5'-P-CCNC: 11 U/L (ref 9–39)
BILIRUB SERPL-MCNC: 0.5 MG/DL (ref 0–1.2)
BUN SERPL-MCNC: 33 MG/DL (ref 6–23)
CALCIUM SERPL-MCNC: 9.4 MG/DL (ref 8.6–10.3)
CHLORIDE SERPL-SCNC: 100 MMOL/L (ref 98–107)
CHOLEST SERPL-MCNC: 86 MG/DL (ref 0–199)
CHOLESTEROL/HDL RATIO: 2.6
CO2 SERPL-SCNC: 28 MMOL/L (ref 21–32)
CREAT SERPL-MCNC: 1.22 MG/DL (ref 0.5–1.3)
ERYTHROCYTE [DISTWIDTH] IN BLOOD BY AUTOMATED COUNT: 12.9 % (ref 11.5–14.5)
EST. AVERAGE GLUCOSE BLD GHB EST-MCNC: 183 MG/DL
GFR SERPL CREATININE-BSD FRML MDRD: 61 ML/MIN/1.73M*2
GLUCOSE SERPL-MCNC: 245 MG/DL (ref 74–99)
HBA1C MFR BLD: 8 %
HCT VFR BLD AUTO: 44.7 % (ref 41–52)
HDLC SERPL-MCNC: 33.2 MG/DL
HGB BLD-MCNC: 14.8 G/DL (ref 13.5–17.5)
LDLC SERPL CALC-MCNC: 35 MG/DL
MCH RBC QN AUTO: 31.7 PG (ref 26–34)
MCHC RBC AUTO-ENTMCNC: 33.1 G/DL (ref 32–36)
MCV RBC AUTO: 96 FL (ref 80–100)
NON HDL CHOLESTEROL: 53 MG/DL (ref 0–149)
NRBC BLD-RTO: 0 /100 WBCS (ref 0–0)
PLATELET # BLD AUTO: 194 X10*3/UL (ref 150–450)
POTASSIUM SERPL-SCNC: 4.6 MMOL/L (ref 3.5–5.3)
PROT SERPL-MCNC: 7.4 G/DL (ref 6.4–8.2)
PSA SERPL-MCNC: 0.22 NG/ML
RBC # BLD AUTO: 4.67 X10*6/UL (ref 4.5–5.9)
SODIUM SERPL-SCNC: 137 MMOL/L (ref 136–145)
TRIGL SERPL-MCNC: 87 MG/DL (ref 0–149)
VIT B12 SERPL-MCNC: 1880 PG/ML (ref 211–911)
VLDL: 17 MG/DL (ref 0–40)
WBC # BLD AUTO: 8.7 X10*3/UL (ref 4.4–11.3)

## 2023-11-30 PROCEDURE — 80061 LIPID PANEL: CPT

## 2023-11-30 PROCEDURE — 80053 COMPREHEN METABOLIC PANEL: CPT

## 2023-11-30 PROCEDURE — 84153 ASSAY OF PSA TOTAL: CPT

## 2023-11-30 PROCEDURE — 36415 COLL VENOUS BLD VENIPUNCTURE: CPT

## 2023-11-30 PROCEDURE — 85027 COMPLETE CBC AUTOMATED: CPT

## 2023-11-30 PROCEDURE — 83036 HEMOGLOBIN GLYCOSYLATED A1C: CPT

## 2023-11-30 PROCEDURE — 82607 VITAMIN B-12: CPT

## 2023-12-01 ENCOUNTER — TELEPHONE (OUTPATIENT)
Dept: PRIMARY CARE | Facility: CLINIC | Age: 78
End: 2023-12-01
Payer: MEDICARE

## 2023-12-01 NOTE — TELEPHONE ENCOUNTER
Pt spouse left vm stating pt up all night w/cough.  Increase glucose and neuropathy. Requested call back to pt to discuss.

## 2023-12-06 ENCOUNTER — ANCILLARY PROCEDURE (OUTPATIENT)
Dept: RADIOLOGY | Facility: CLINIC | Age: 78
End: 2023-12-06
Payer: MEDICARE

## 2023-12-06 ENCOUNTER — OFFICE VISIT (OUTPATIENT)
Dept: PRIMARY CARE | Facility: CLINIC | Age: 78
End: 2023-12-06
Payer: MEDICARE

## 2023-12-06 VITALS
HEIGHT: 70 IN | WEIGHT: 172 LBS | RESPIRATION RATE: 16 BRPM | BODY MASS INDEX: 24.62 KG/M2 | HEART RATE: 68 BPM | OXYGEN SATURATION: 98 % | SYSTOLIC BLOOD PRESSURE: 118 MMHG | TEMPERATURE: 98.3 F | DIASTOLIC BLOOD PRESSURE: 68 MMHG

## 2023-12-06 DIAGNOSIS — M54.42 CHRONIC LEFT-SIDED LOW BACK PAIN WITH LEFT-SIDED SCIATICA: ICD-10-CM

## 2023-12-06 DIAGNOSIS — G89.29 CHRONIC LEFT-SIDED LOW BACK PAIN WITH LEFT-SIDED SCIATICA: ICD-10-CM

## 2023-12-06 DIAGNOSIS — J30.5 ALLERGIC RHINITIS DUE TO FOOD: ICD-10-CM

## 2023-12-06 DIAGNOSIS — E11.9 TYPE 2 DIABETES MELLITUS WITHOUT COMPLICATION, UNSPECIFIED WHETHER LONG TERM INSULIN USE (MULTI): ICD-10-CM

## 2023-12-06 DIAGNOSIS — I10 PRIMARY HYPERTENSION: Primary | ICD-10-CM

## 2023-12-06 DIAGNOSIS — E11.9 DM TYPE 2 WITHOUT RETINOPATHY (MULTI): ICD-10-CM

## 2023-12-06 DIAGNOSIS — E78.2 MIXED HYPERLIPIDEMIA: ICD-10-CM

## 2023-12-06 PROBLEM — B35.9 DERMATOPHYTOSIS: Status: RESOLVED | Noted: 2023-09-05 | Resolved: 2023-12-06

## 2023-12-06 PROBLEM — M79.604 RIGHT LEG PAIN: Status: ACTIVE | Noted: 2023-06-30

## 2023-12-06 PROBLEM — D18.00 ANGIOMA: Status: RESOLVED | Noted: 2023-09-05 | Resolved: 2023-12-06

## 2023-12-06 PROBLEM — B35.3 TINEA PEDIS: Status: RESOLVED | Noted: 2023-09-05 | Resolved: 2023-12-06

## 2023-12-06 PROBLEM — R61 NIGHT SWEATS: Status: RESOLVED | Noted: 2023-09-05 | Resolved: 2023-12-06

## 2023-12-06 PROBLEM — M79.676 CHRONIC PAIN OF TOE: Status: RESOLVED | Noted: 2023-09-05 | Resolved: 2023-12-06

## 2023-12-06 PROBLEM — H90.3 BILATERAL SENSORINEURAL HEARING LOSS: Status: RESOLVED | Noted: 2023-06-30 | Resolved: 2023-12-06

## 2023-12-06 PROBLEM — B36.8 TINEA INCOGNITO: Status: RESOLVED | Noted: 2023-09-05 | Resolved: 2023-12-06

## 2023-12-06 PROBLEM — R53.83 FATIGUE: Status: RESOLVED | Noted: 2023-09-05 | Resolved: 2023-12-06

## 2023-12-06 PROBLEM — R06.2 WHEEZING ON AUSCULTATION: Status: RESOLVED | Noted: 2023-09-05 | Resolved: 2023-12-06

## 2023-12-06 PROBLEM — R00.0 TACHYCARDIA: Status: RESOLVED | Noted: 2023-09-05 | Resolved: 2023-12-06

## 2023-12-06 PROBLEM — S61.218A LACERATION OF FINGER, INDEX: Status: RESOLVED | Noted: 2023-09-05 | Resolved: 2023-12-06

## 2023-12-06 PROBLEM — L72.0 EPIDERMAL CYST: Status: RESOLVED | Noted: 2023-09-05 | Resolved: 2023-12-06

## 2023-12-06 PROBLEM — E87.1 HYPONATREMIA: Status: RESOLVED | Noted: 2023-09-05 | Resolved: 2023-12-06

## 2023-12-06 PROBLEM — R09.A2 FOREIGN BODY SENSATION IN THROAT: Status: RESOLVED | Noted: 2023-09-05 | Resolved: 2023-12-06

## 2023-12-06 PROBLEM — B35.1 NAIL FUNGUS: Status: RESOLVED | Noted: 2023-09-05 | Resolved: 2023-12-06

## 2023-12-06 PROBLEM — I51.9 HEART DISEASE: Status: RESOLVED | Noted: 2023-09-05 | Resolved: 2023-12-06

## 2023-12-06 PROCEDURE — 72100 X-RAY EXAM L-S SPINE 2/3 VWS: CPT | Mod: FY

## 2023-12-06 PROCEDURE — 1160F RVW MEDS BY RX/DR IN RCRD: CPT | Performed by: INTERNAL MEDICINE

## 2023-12-06 PROCEDURE — 73562 X-RAY EXAM OF KNEE 3: CPT | Mod: LEFT SIDE | Performed by: RADIOLOGY

## 2023-12-06 PROCEDURE — 1036F TOBACCO NON-USER: CPT | Performed by: INTERNAL MEDICINE

## 2023-12-06 PROCEDURE — 3078F DIAST BP <80 MM HG: CPT | Performed by: INTERNAL MEDICINE

## 2023-12-06 PROCEDURE — 1159F MED LIST DOCD IN RCRD: CPT | Performed by: INTERNAL MEDICINE

## 2023-12-06 PROCEDURE — 3074F SYST BP LT 130 MM HG: CPT | Performed by: INTERNAL MEDICINE

## 2023-12-06 PROCEDURE — 73502 X-RAY EXAM HIP UNI 2-3 VIEWS: CPT | Mod: LT

## 2023-12-06 PROCEDURE — 72100 X-RAY EXAM L-S SPINE 2/3 VWS: CPT | Performed by: RADIOLOGY

## 2023-12-06 PROCEDURE — 1125F AMNT PAIN NOTED PAIN PRSNT: CPT | Performed by: INTERNAL MEDICINE

## 2023-12-06 PROCEDURE — 99214 OFFICE O/P EST MOD 30 MIN: CPT | Performed by: INTERNAL MEDICINE

## 2023-12-06 PROCEDURE — 73562 X-RAY EXAM OF KNEE 3: CPT | Mod: LT,FY

## 2023-12-06 PROCEDURE — 73502 X-RAY EXAM HIP UNI 2-3 VIEWS: CPT | Mod: LEFT SIDE | Performed by: RADIOLOGY

## 2023-12-06 RX ORDER — ALBUTEROL SULFATE 90 UG/1
AEROSOL, METERED RESPIRATORY (INHALATION)
Qty: 18 G | Refills: 1 | Status: SHIPPED | OUTPATIENT
Start: 2023-12-06 | End: 2024-01-22

## 2023-12-06 RX ORDER — DICLOFENAC SODIUM 100 MG/1
100 TABLET, EXTENDED RELEASE ORAL 2 TIMES DAILY PRN
COMMUNITY
Start: 2023-12-01 | End: 2024-01-26 | Stop reason: ALTCHOICE

## 2023-12-06 RX ORDER — GLIMEPIRIDE 1 MG/1
TABLET ORAL
Qty: 270 TABLET | Refills: 3 | Status: SHIPPED | OUTPATIENT
Start: 2023-12-06

## 2023-12-06 ASSESSMENT — PAIN SCALES - GENERAL: PAINLEVEL: 6

## 2023-12-06 ASSESSMENT — ENCOUNTER SYMPTOMS
SHORTNESS OF BREATH: 0
FATIGUE: 0
FEVER: 0
COUGH: 0

## 2023-12-06 NOTE — PROGRESS NOTES
"Subjective   Bob Aceves is a 78 y.o. male who presents for Diabetes follow up.    HPI   Went to Urgent Care on 12/1/23 for LLE pain and cough eval.  Rx: Diclofenac, Mucinex  Spouse reports h/o fall x1.5 years ago.  Occasional limping since.  Pain is improving. Using cane for transfers and ambulation.   Taking Diclofenac BID.  Mucinex BID.  Reports some continued non productive cough, voice hoarseness.  Worse at night in bed.    Denies chest pain/pressure/heaviness.     Monitoring glucose BID-QID.  Glucose increasing.  Ave: 180-200.  Taking Jardiance 1/2 tab daily.  Denies adverse sx's r/t DM.      Review of Systems   Constitutional:  Negative for fatigue and fever.   Respiratory:  Negative for cough and shortness of breath.    Cardiovascular:  Negative for chest pain and leg swelling.   All other systems reviewed and are negative.      Health Maintenance Due   Topic Date Due    Diabetes: Foot Exam  Never done    Diabetes: Retinopathy Screening  Never done    Hepatitis C Screening  Never done    COVID-19 Vaccine (5 - Pfizer series) 11/16/2023       Objective   /68   Pulse 68   Temp 36.8 °C (98.3 °F)   Resp 16   Ht 1.765 m (5' 9.5\")   Wt 78 kg (172 lb)   SpO2 98%   BMI 25.04 kg/m²     Physical Exam  Vitals and nursing note reviewed.   Constitutional:       Appearance: Normal appearance.   HENT:      Head: Normocephalic.   Eyes:      Conjunctiva/sclera: Conjunctivae normal.      Pupils: Pupils are equal, round, and reactive to light.   Cardiovascular:      Rate and Rhythm: Normal rate and regular rhythm.      Pulses: Normal pulses.      Heart sounds: Normal heart sounds.   Pulmonary:      Effort: Pulmonary effort is normal.      Breath sounds: Normal breath sounds.   Musculoskeletal:         General: No swelling.      Cervical back: Neck supple.   Skin:     General: Skin is warm and dry.   Neurological:      General: No focal deficit present.      Mental Status: He is oriented to person, place, and time. 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  Continue established treatment plan and follow up at least yearly.

## 2023-12-08 ENCOUNTER — TELEPHONE (OUTPATIENT)
Dept: PRIMARY CARE | Facility: CLINIC | Age: 78
End: 2023-12-08
Payer: MEDICARE

## 2023-12-08 DIAGNOSIS — M48.062 SPINAL STENOSIS OF LUMBAR REGION WITH NEUROGENIC CLAUDICATION: Primary | ICD-10-CM

## 2023-12-08 NOTE — RESULT ENCOUNTER NOTE
Call patient I reviewed his xrays and he has arthritis -  knee and hip are manageable  With is leg issue I am concerned this is coming from his back  Really want to try to avoid surgery  I would like to order mri of lumbar spine and refer to pain management for poss injections   I think he has pinched nerve and that is why his leg is having pain and weakness

## 2023-12-08 NOTE — TELEPHONE ENCOUNTER
----- Message from Lina Beltrán DO sent at 12/8/2023  9:36 AM EST -----  Call patient I reviewed his xrays and he has arthritis -  knee and hip are manageable  With is leg issue I am concerned this is coming from his back  Really want to try to avoid surgery  I would like to order mri of lumbar spine and refer to pain management for poss injections   I think he has pinched nerve and that is why his leg is having pain and weakness

## 2023-12-08 NOTE — TELEPHONE ENCOUNTER
Pt made aware and voiced understanding.  Pt has already scheduled MRI.  Can you please assist in scheduling w/Pain Management.

## 2023-12-18 ENCOUNTER — TELEPHONE (OUTPATIENT)
Dept: PRIMARY CARE | Facility: CLINIC | Age: 78
End: 2023-12-18
Payer: MEDICARE

## 2023-12-18 DIAGNOSIS — G89.29 CHRONIC LEFT-SIDED LOW BACK PAIN WITH LEFT-SIDED SCIATICA: Primary | ICD-10-CM

## 2023-12-18 DIAGNOSIS — R29.898 WEAKNESS OF LEFT LOWER EXTREMITY: ICD-10-CM

## 2023-12-18 DIAGNOSIS — M54.42 CHRONIC LEFT-SIDED LOW BACK PAIN WITH LEFT-SIDED SCIATICA: Primary | ICD-10-CM

## 2023-12-27 ENCOUNTER — ANCILLARY PROCEDURE (OUTPATIENT)
Dept: RADIOLOGY | Facility: CLINIC | Age: 78
End: 2023-12-27
Payer: MEDICARE

## 2023-12-27 DIAGNOSIS — M48.062 SPINAL STENOSIS OF LUMBAR REGION WITH NEUROGENIC CLAUDICATION: ICD-10-CM

## 2023-12-27 PROCEDURE — 72148 MRI LUMBAR SPINE W/O DYE: CPT | Performed by: RADIOLOGY

## 2023-12-27 PROCEDURE — 72148 MRI LUMBAR SPINE W/O DYE: CPT

## 2023-12-29 ENCOUNTER — TELEPHONE (OUTPATIENT)
Dept: PRIMARY CARE | Facility: CLINIC | Age: 78
End: 2023-12-29
Payer: MEDICARE

## 2023-12-29 DIAGNOSIS — M48.062 SPINAL STENOSIS OF LUMBAR REGION WITH NEUROGENIC CLAUDICATION: Primary | ICD-10-CM

## 2023-12-29 NOTE — TELEPHONE ENCOUNTER
Patient scheduled for Dr Acevedo on 1/11/24 @ 10 AM, patient aware, referral mailed to patient's home

## 2023-12-29 NOTE — TELEPHONE ENCOUNTER
----- Message from Lina Beltrán DO sent at 12/29/2023 12:46 PM EST -----  Call patient his mri of the spine shows extensive arthritis and disc degeneration   It is putting pressure on the nerves  This is causing his pain and symptoms  I want to make sure we have gotten him  pain med and neurosurgery-spine specialist to look at films  Im hoping we can do injections     Referrals in

## 2023-12-29 NOTE — RESULT ENCOUNTER NOTE
Call patient his mri of the spine shows extensive arthritis and disc degeneration   It is putting pressure on the nerves  This is causing his pain and symptoms  I want to make sure we have gotten him  pain med and neurosurgery-spine specialist to look at films  Im hoping we can do injections     Referrals in

## 2023-12-29 NOTE — TELEPHONE ENCOUNTER
Pt made aware.  Can  you please assist w/scheduling w/Neurosurgeon.  Scheduled w/Pain Med already.

## 2024-01-02 ENCOUNTER — OFFICE VISIT (OUTPATIENT)
Dept: PAIN MEDICINE | Facility: CLINIC | Age: 79
End: 2024-01-02
Payer: MEDICARE

## 2024-01-02 VITALS
DIASTOLIC BLOOD PRESSURE: 90 MMHG | SYSTOLIC BLOOD PRESSURE: 175 MMHG | TEMPERATURE: 97 F | RESPIRATION RATE: 18 BRPM | HEART RATE: 82 BPM

## 2024-01-02 DIAGNOSIS — M54.16 LUMBAR RADICULOPATHY: Primary | ICD-10-CM

## 2024-01-02 PROCEDURE — 3077F SYST BP >= 140 MM HG: CPT | Performed by: PAIN MEDICINE

## 2024-01-02 PROCEDURE — 1159F MED LIST DOCD IN RCRD: CPT | Performed by: PAIN MEDICINE

## 2024-01-02 PROCEDURE — 99214 OFFICE O/P EST MOD 30 MIN: CPT | Performed by: PAIN MEDICINE

## 2024-01-02 PROCEDURE — 99204 OFFICE O/P NEW MOD 45 MIN: CPT | Performed by: PAIN MEDICINE

## 2024-01-02 PROCEDURE — 3080F DIAST BP >= 90 MM HG: CPT | Performed by: PAIN MEDICINE

## 2024-01-02 PROCEDURE — 1036F TOBACCO NON-USER: CPT | Performed by: PAIN MEDICINE

## 2024-01-02 PROCEDURE — 1125F AMNT PAIN NOTED PAIN PRSNT: CPT | Performed by: PAIN MEDICINE

## 2024-01-02 PROCEDURE — 1160F RVW MEDS BY RX/DR IN RCRD: CPT | Performed by: PAIN MEDICINE

## 2024-01-02 RX ORDER — GABAPENTIN 100 MG/1
100 CAPSULE ORAL 3 TIMES DAILY
Qty: 90 CAPSULE | Refills: 11 | Status: SHIPPED | OUTPATIENT
Start: 2024-01-02 | End: 2024-05-10 | Stop reason: SDUPTHER

## 2024-01-02 ASSESSMENT — PAIN SCALES - GENERAL: PAINLEVEL: 7

## 2024-01-02 NOTE — PROGRESS NOTES
Left calf region pain has been for the past month  has recently taken an alleve  Has had MRI xray and US

## 2024-01-02 NOTE — H&P
History Of Present Illness  Bob Aceves is a 78 y.o. male presenting with Left calf region pain has been for the past month  has recently taken an alleve  Has had MRI xray and US  Denies currently having any back pain he believes that the calf pain have started around the Thanksgiving a recent MRI has demonstrated a scoliosis with component of stenosis the patient has been continuing to do exercises at home including elliptical and the treadmill without any significant improvement.  Has continued to be taking Aleve without any significant improvement.  Some fall and he started using a walker since to assist him with his posture he believes early in the morning the Pain is the worst rating it between 9-10 out of 10 described mainly as a sharp pain     Past Medical History  Past Medical History:   Diagnosis Date    Other nonthrombocytopenic purpura (CMS/HCC) 08/20/2019    Senile purpura    Personal history of malignant neoplasm of prostate 12/10/2020    History of malignant neoplasm of prostate    Personal history of other diseases of the respiratory system     History of bronchitis    Personal history of other diseases of the respiratory system     History of upper respiratory infection    Personal history of other endocrine, nutritional and metabolic disease 12/18/2019    History of hyperlipidemia       Surgical History  Past Surgical History:   Procedure Laterality Date    APPENDECTOMY  10/20/2016    Appendectomy    CATARACT EXTRACTION  10/20/2016    Cataract Surgery    CORONARY ARTERY BYPASS GRAFT  10/20/2016    CABG    PILONIDAL CYST DRAINAGE  10/20/2016    Pilonidal Cyst Resection        Social History  He reports that he has quit smoking. His smoking use included cigarettes. He has never used smokeless tobacco. He reports that he does not currently use alcohol. He reports that he does not currently use drugs.    Family History  Family History   Problem Relation Name Age of Onset    Alzheimer's disease Mother       Heart attack Father      Diabetes Paternal Grandfather          type 2 DM        Allergies  Sulfamethoxazole-trimethoprim    Review of Systems   12 Systems have been reviewed as follows.   Constitutional: Fever, weight gain, weight loss, appetite change, night sweats, fatigue, chills.  Eyes : blurry, double vision, vision, loss, tearing, redness, pain, sensitivity to light, glaucoma.  Ears, nose, mouth, and throat: Hearing loss, ringing in the ears, ear pain, nasal congestion, nasal drainage, nosebleeds, mouth, throat, irritation tooth problem.  Cardiovascular :chest pain, pressure, heart tracing,palpaitations , sweating, leg swelling, high or low blood pressure  Pulmonary: Cough, yellow or green sputum, blood and sputum, shortness of breath, wheezing  Gastrointestinal: Nause, vomiting, diarrhea, constipation, pain, blood in stool, or vomitus, heartburn, difficulty swallowing  Genitourinary: incontinence, abnormal bleeding, abnormal discharge, urinary frequency, urinary hesitancy, pain, impotence sexual problem, infection, urinary retention  Musculoskeletal: Pain, stiffness, joint, redness or warmth, arthritis, back pain, weakness, muscle wasting, sprain or fracture  Neuro: Weight weakness, dizziness, change in voice, change in taste change in vision, change in hearing, loss, or change of sensation, trouble walking, balance problems coordination problems, shaking, speech problem  Endocrine , cold or heat intolerance, blood sugar problem, weight gain or loss missed periods hot flashes, sweats, change in body hair, change in libido, increased thirst, increased urination  Heme/lymph: Swelling, bleeding, problem anemia, bruising, enlarged lymph nodes  Allergic/immunologic: H. plus nasal drip, watery itchy eyes, nasal drainage, immunosuppressed  The above, were reviewed and noted negative except as noted.    Physical Exam   Vital signs reviewed, documented in chart     General:  Appears well, does not look in any  major distress  Alert    HEENT:  Head atraumatic  Eyes normal inspection  PERRL  Normal ENT inspection  No signs of dehydration    NECK:  Normal inspection  Range of motion within normal     RESPIRATORY:  No respiratory distress    CVS:  Heart rate and rhythm regular    ABDOMEN/GI  Soft  Non-tender  No distention  No organomegaly      BACK:  Normal inspection, flexion and extension within normal limit   no tenderness upon the palpation of the facet joint  Si joints none tender to palpations     EXTREMITIES:  Non-Tender  Full ROM  Normal appearance  No Pedal edema  Power symmetrical , sensory examination preserved.    NEURO:  Alert and oriented X 3  CNS normal as tested without focal neurological deficit   Sensation normal  Motor ambulates with a limp   reflexes absent     PSYCH:  Mood normal  Affect normal    SKIN:  Color normal  No rash  Warm  Dry  no sign of skin marking supportive of IV drug usage /abuse.    Last Recorded Vitals  Blood pressure 175/90, pulse 82, temperature 36.1 °C (97 °F), resp. rate 18.    Relevant Results      MR lumbar spine wo IV contrast    Result Date: 12/28/2023  Interpreted By:  Isidro Brown,  and Stephany Carr STUDY: MR LUMBAR SPINE WO IV CONTRAST   INDICATION: Signs/Symptoms:left leg weakness   COMPARISON: Lumbar spine radiograph 12/06/2023.   ACCESSION NUMBER(S): [id number]   ORDERING CLINICIAN: RUSSEL CRAWFORD   TECHNIQUE: Multiplanar multisequence MRI of the lumbar spine was performed without the administration of intravenous contrast, according to standard protocol.   FINDINGS: ALIGNMENT: Redemonstration of S shaped dextroscoliosis of the upper lumbar spine, 20ï¿½ convex to the right centered at T12-L1 and to the left at L4. Mild retrolisthesis of L2-3 and L3-4. Otherwise, the alignment is within normal limits.   VERTEBRAE: Schmorl's nodes involving multiple vertebral levels for example, T10, T12, L1, and L3. Multiple round Modic type 2 lesions involving T11, L1, L3 and L4. Reactive  endplate changes involving L1-2 and L3-4. Mild vertebral height loss most pronounced at the level of T12. Otherwise, vertebral bodies are normal in height without fracture or aggressive osseous lesion.   DISCS: There is multilevel intervertebral disc height loss most pronounced at the levels of L1-2, L2-3, and L3-4. Otherwise, the disc spaces are largely maintained.   CONUS MEDULLARIS AND CAUDA EQUINA: The conus medullaris terminates at L1-2. There is normal appearance of the conus medullaris and cauda equina.   PARAVERTEBRAL SOFT TISSUES AND VISUALIZED RETROPERITONEUM: The visualized paravertebral soft tissues appear within normal limits.   EVALUATION OF INDIVIDUAL LEVELS:   T9-10: No stenosis is noted on the sagittal images.   T10-11: The thecal sac is mildly indented by disc bulge more so on the left on the sagittal images.   T11-12: The thecal sac is mildly indented by disc bulge on the sagittal images.     T12-L1: Disc bulge causes mild left lateral recess and neural foraminal narrowing. The thecal sac is indented by small focal central protrusion.   L1-2: Disc bulge and ligamentum flavum hypertrophy combined to causes moderate to severe left and to lesser degree right lateral recess narrowing as well as moderate central canal stenosis.   L2-3: The lateral recesses and spinal canal are severely stenosed by disc protrusion, ligament thickening and facet hypertrophy. The left neural foramen is moderately to severely stenosed by endplate spurring and facet hypertrophy.   L3-4: The right neural foramen is moderately to severely stenosed by facet hypertrophy endplate spurring and disc bulge with mild left neural foraminal stenosis. The lateral recesses are mildly to moderately stenosed more so on the right by disc bulge, ligament thickening and facet hypertrophy.   L4-5: The left and to a lesser degree right lateral recesses are moderately to severely stenosed secondary to the scoliosis, facet hypertrophy and disc  bulge. The neural foramina are severely stenosed bilaterally. The facet joints are severely arthritic worse on the right.   L5-S1: The neural foramina are severely stenosed by endplate spurring and facet hypertrophy bilaterally more so on the left. The facet joints are severely arthritic more so on the right. The lateral recesses are mildly stenosed by disc bulge and ligament thickening.     UPPER SACRUM AND SACROILIAC JOINTS: Unremarkable.       Multilevel degenerative changes are present with severe lateral recess and to lesser degree spinal canal stenoses most prominent at L2-3 and L1-2.     I personally reviewed the images/study and I agree with the findings as stated by Dr. Bruno Hammond. This study was interpreted at [place], [place].   MACRO: None   Signed by: Isidro Brown 12/28/2023 11:25 AM Dictation workstation:   [id number]    XR hip left with pelvis when performed 2 or 3 views    Result Date: 12/7/2023  Interpreted By:  Judie Braga, STUDY: Single view pelvis. Left hip, two views.   INDICATION: Signs/Symptoms:leg pain.   COMPARISON: None.   ACCESSION NUMBER(S): [id number]   ORDERING CLINICIAN: RUSSEL CRAWFORD   FINDINGS: No acute fracture or malalignment. Bilateral hip joint spaces are well preserved. Advanced bilateral femoral artery vascular calcifications noted.       1. Unremarkable radiographs of the pelvis and bilateral hips.   MACRO: None.   Signed by: Judie Braga 12/7/2023 6:04 PM Dictation workstation:   [id number]    XR lumbar spine 2-3 views    Result Date: 12/7/2023  Interpreted By:  Judie Braga, STUDY: Lumbar spine, three views.   INDICATION: Signs/Symptoms:left leg weakness.   COMPARISON: None.   ACCESSION NUMBER(S): [id number]   ORDERING CLINICIAN: RUSSEL CRAWFORD   FINDINGS: Mild S shaped thoracolumbar scoliosis with dextroscoliosis centered in the thoracolumbar junction and levoscoliosis centered in the lower lumbar region.  Straightening of normal lumbar lordosis. Severe diffuse discogenic degenerative changes noted with disc height loss and endplate sclerosis with osteophytes. Additionally, severe facet joint arthropathy at L4-5 and L5-S1 noted. Atherosclerosis of the abdominal aorta. Vertebral body heights are preserved. Posterior elements are intact.       1. Severe diffuse degenerative changes of the lumbar spine with mild S shaped thoracolumbar scoliosis.   MACRO: None.   Signed by: Judie Braga 12/7/2023 6:04 PM Dictation workstation:   [id number]    XR knee left 3 views    Result Date: 12/7/2023  Interpreted By:  Judie Braga, STUDY: Left knee, 3 views.   INDICATION: Signs/Symptoms:leg pain.   COMPARISON: None.   ACCESSION NUMBER(S): [id number]   ORDERING CLINICIAN: RUSSEL CRAWFORD   FINDINGS: No acute fracture or malalignment. Joint spaces are well maintained. No significant knee joint effusion. Severe vascular calcifications of the femoral and popliteal as well as tibial arteries.       1. Unremarkable left knee radiographs. 2. Advanced vascular calcifications of the left lower extremity   MACRO: None.   Signed by: Judie Braga 12/7/2023 6:03 PM Dictation workstation:   [id number]        Assessment/Plan          78 years old with history and physical examination supportive of lumbar radiculopathy with component of lumbar stenosis    Plan  Advised the patient that I will be referring him to initiate physical therapy targeting the lower back and the lower extremity I will be also starting the patient on a titration of gabapentin starting at 100 mg at bedtime for few days if the patient does not have any significant side effect he will raise it to twice a day and after few days if he continues to have no side effect he is allowed to increase it to 3 times per day I will reevaluate the patient after the performance of the physical therapy to determine the need for an epidural steroid injection to be performed under  fluoroscopic guidance targeting the lower lumbar spine area patient verbalized understanding and agreement with the plan      The above clinical summary has been dictated with voice recognition software. It has not been proofread for grammatical errors, typographical mistakes, or other semantic inconsistencies.    Thank you for visiting our office today. It was our pleasure to take part in your healthcare.     Please do not hesitate to contact the pain clinic after your visit with any questions or concerns at  M-F 8-4 pm       Trudi Roberts M.D.  Medical Director , Division of Pain Medicine [facility]   of Anesthesiology and Pain Medicine  [facility]     [place] [address] [address] [place] [address]     Office: [phone]  Fax: [phone]       Trudi Roberts MD

## 2024-01-03 ENCOUNTER — TELEPHONE (OUTPATIENT)
Dept: PRIMARY CARE | Facility: CLINIC | Age: 79
End: 2024-01-03
Payer: MEDICARE

## 2024-01-03 NOTE — TELEPHONE ENCOUNTER
Saw Pain management yesterday  He ordered a new medication and PT  Should he keep appt with neurosurgeon?    Patient also reports blood glucose has been in 160 range. Yesterday and today

## 2024-01-05 DIAGNOSIS — E11.9 DM TYPE 2 WITHOUT RETINOPATHY (MULTI): ICD-10-CM

## 2024-01-08 RX ORDER — BLOOD-GLUCOSE CONTROL, NORMAL
EACH MISCELLANEOUS
Qty: 300 EACH | Refills: 2 | Status: SHIPPED | OUTPATIENT
Start: 2024-01-08

## 2024-01-10 ENCOUNTER — EVALUATION (OUTPATIENT)
Dept: PHYSICAL THERAPY | Facility: CLINIC | Age: 79
End: 2024-01-10
Payer: MEDICARE

## 2024-01-10 DIAGNOSIS — M54.9 BACK PAIN: ICD-10-CM

## 2024-01-10 DIAGNOSIS — M25.69 BACK STIFFNESS: ICD-10-CM

## 2024-01-10 DIAGNOSIS — M54.16 LUMBAR RADICULOPATHY: ICD-10-CM

## 2024-01-10 DIAGNOSIS — M79.609 PAIN IN LIMB: Primary | ICD-10-CM

## 2024-01-10 PROCEDURE — 97161 PT EVAL LOW COMPLEX 20 MIN: CPT | Mod: GP

## 2024-01-10 PROCEDURE — 97110 THERAPEUTIC EXERCISES: CPT | Mod: GP

## 2024-01-10 ASSESSMENT — PAIN - FUNCTIONAL ASSESSMENT: PAIN_FUNCTIONAL_ASSESSMENT: 0-10

## 2024-01-10 ASSESSMENT — PAIN SCALES - GENERAL: PAINLEVEL_OUTOF10: 7

## 2024-01-10 NOTE — PROGRESS NOTES
Physical Therapy    Physical Therapy Evaluation and Treatment      Patient Name: Bob Aceves  MRN: [id number]  Today's Date: 1/10/2024  Time Calculation  Start Time: 1445  Stop Time: 1530  Time Calculation (min): 45 min    Assessment:    The patient presents to Physical Therapy with signs and symptoms consistent with the medical diagnosis of lumbar radiculopathy and lumbar degenerative changes. Key impairments include: nerve pain at distributions of common fibular and superficial fibular nerve, stiffness with lumbar AROM, weakness with L DF and great toe extension, and difficulty with functional activities such as walking, groceries, and taking out trash.    Standardized testing and measures administered today, including ROM, strength, joint mobility testing, and observation which reveal that the patient has multiple impairments in body structure and functions, activity limitations, and participation restrictions. The patient has personal factors and comorbidities that may serve as barriers affecting plan of care. Today's findings indicate that the patient is of low complexity and would benefit from skilled PT to make measurable and meaningful changes in the above outcome measures and achieve improvements in the patient's functional status and individual goals. The patient verbalized understanding and is in agreement with all goals and plan of care.      Plan:   Pt will be seen 1-2x/week for 10 visits. Potential to achieve rehab goals is good. Plan of care was developed with input and agreement by the patient.    Treatment may include: Therapeutic Exercise (PROM, AA/AROM, Flexibility, Strengthening, Stabilization, HEP instruction). Therapeutic Activities (Transfers, Body Mechanics, Work Related Activities, Closed Chain, Agility and Power). Manual Techniques (Soft tissue Mobilization, Joint Mobilization/Distraction, Muscle Energy Techniques, Lymphatic Drainage, Dry Needling). Neuromuscular Reeducation (Postural  Training, Balance/Proprioception, Relaxation Techniques). Biofeedback. Aquatic Exercise. Modalities: Ultrasound, Cryotherapy, Vasopneumatic with or without Cryotherapy. Electrical Stimulation (TENS/IFC/ Premodulated for pain relief, NMES for Muscle reeducation). Gait Training. Orthotic Fit and Training. Strapping, Kinesiotaping.       Current Problem:   1. Pain in limb        2. Lumbar radiculopathy  Referral to Physical Therapy    Follow Up In Physical Therapy      3. Back stiffness        4. Back pain            Subjective    Pt is a 78  year old male with complaints of mild back pain and radiculopathy, as well as LLE weakness/ unsteadiness. Pain between 5 to 10 on pain scale. Radiculopathy down posterolateral calf and numbness/ tingling on anterior foot.   Onset: Pt unsure, but two possibilities include: Thanksgiving 2023. No new activities at this time besides picking up 2 year old. Getting out of van 1.5 years ago and slipped on gravel. No fall. Used a cane that day and recovered quickly.   Aggravating factors/ Functional Limitations: Worse when getting out of bed. > 10 -15 min of standing/ walking.   Alleviating factors: continued movement, gabapentin.    Pain descriptors: sharp in morning, stiff, dull  Imaging: Multilevel degenerative changes are present with severe lateral  recess and to lesser degree spinal canal stenoses most prominent at  L2-3 and L1-2. Redemonstration of S shaped dextroscoliosis of the upper  lumbar spine, convex to the right centered at T12-L1 and to the  left at L4. Mild retrolisthesis of L2-3 and L3-4.      Home: Lives in Condo. Not restricted by pain. Garbage to the street and bringing in groceries are difficult. 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 Son helps lives 5 min away. No stairs.     Pt denies night pain, unrelenting pain, unexplained weight loss 10-20lb in the last 4 weeks, loss of appetite, unusual lumps or growth, unusual fatigue. Pt denies bowel or bladder  changes.    Precautions:  Precautions  Precautions Comment: Fall around [place] 2023. Was using a cane. Leg gave out stepping off a curb/lip on the ground and fell, hiting face. Bruise to cheek and broke center of glasses. Did not seek medical care, no post concussive sx reported.    Pain:  Pain Assessment  Pain Assessment: 0-10  Pain Score: 7      Objective     Babinski's (-)  Clonus (-)    Reflexes:  Achilles: 0/0    Lumbar ROM (degrees):  Flexion: 75%  Extension: 25%  Sidebend R/L: 50% devan  Rotation R/L: 75%  HIP AROM (degrees):  Hip Internal Rotation R/L: moderate restriction  Hip External Rotation R/L:  mild restriction    Flexibility (degrees):   Hamstring 90/90 position R/L:30 devan  Rectus (measured prone without spine motion) R/L: 100 discomfort / 110    Strength Testing:  Hip Abduction R/L: 4/3  Hip Extension R/L:  3/2  Hip Flexion R/L: 4-/4-  Knee extension R/L:  5/5  Knee Flexion R/L: 4+/4+  DF R/L: 5/4  Great toe extension R/L: 5/3+    Posture:  Sitting:  PPT    Observation:  Spasm/Tenderness: none  Gait: devan outtoeing, genu varum, equal stride length, FWW one peg too high but unable to adjust    Outcome Measures:  Other Measures  Oswestry Disablity Index (APRIL): 42%     Treatments:  Access Code: [email or web address]  URL: [email or web address]  Date: 01/10/2024  Prepared by: Radha Rodriguez    Exercises  - Supine Lower Trunk Rotation  - 1 x daily - 7 x weekly - 3 sets - 10 reps  - Supine Sciatic Nerve Glide  - 1 x daily - 7 x weekly - 3 sets - 10 reps    Sheet with exercise descriptions and images issued. Skilled intervention utilized in the appropriate selection & application of above exercises. Verbal and tactile cues provided for proper form and technique. Pt demonstrated appropriate form & verbalized understanding of optimal technique for above exercises.    Goals:  Lumbar goals  Patient will verbalize pain (0-10) less than or equal to  0/10 at rest, and  2 /10 with activity.    Modified  Oswestry Disability Questionnaire (MODQ) rating score less than or equal to 10% to demonstrate overall improved functional abilities.    Lumbar AROM will be greater than or equal to: Flexion , Extension and Left and Right Side Bending 100%, to improve joint mechanics during ADL's.    Patient will correctly perform home exercise program to progress current functional status.    Strength will improve to greater than or equal to 4/5 for hip abduction/extension with improved recruitment to improve joint stability during ADL's    Hamstring flexibility will improve to 20 ° or better in 90°/90° position.    Rectus flexibility in prone will be greater than or equal to 120° without spine motion to decrease strain on the spine in standing and walking.    Patient will have improved lumbar and pelvic alignment consistently to decrease pain in the lumbosacral complex.

## 2024-01-11 ENCOUNTER — OFFICE VISIT (OUTPATIENT)
Dept: NEUROSURGERY | Facility: CLINIC | Age: 79
End: 2024-01-11
Payer: MEDICARE

## 2024-01-11 VITALS
HEART RATE: 93 BPM | BODY MASS INDEX: 24.67 KG/M2 | SYSTOLIC BLOOD PRESSURE: 120 MMHG | WEIGHT: 176.2 LBS | HEIGHT: 71 IN | DIASTOLIC BLOOD PRESSURE: 70 MMHG

## 2024-01-11 DIAGNOSIS — G89.29 CHRONIC LEFT-SIDED LOW BACK PAIN WITH LEFT-SIDED SCIATICA: ICD-10-CM

## 2024-01-11 DIAGNOSIS — M54.42 CHRONIC LEFT-SIDED LOW BACK PAIN WITH LEFT-SIDED SCIATICA: ICD-10-CM

## 2024-01-11 PROCEDURE — 1036F TOBACCO NON-USER: CPT | Performed by: STUDENT IN AN ORGANIZED HEALTH CARE EDUCATION/TRAINING PROGRAM

## 2024-01-11 PROCEDURE — 3074F SYST BP LT 130 MM HG: CPT | Performed by: STUDENT IN AN ORGANIZED HEALTH CARE EDUCATION/TRAINING PROGRAM

## 2024-01-11 PROCEDURE — 1159F MED LIST DOCD IN RCRD: CPT | Performed by: STUDENT IN AN ORGANIZED HEALTH CARE EDUCATION/TRAINING PROGRAM

## 2024-01-11 PROCEDURE — 99204 OFFICE O/P NEW MOD 45 MIN: CPT | Performed by: STUDENT IN AN ORGANIZED HEALTH CARE EDUCATION/TRAINING PROGRAM

## 2024-01-11 PROCEDURE — 1160F RVW MEDS BY RX/DR IN RCRD: CPT | Performed by: STUDENT IN AN ORGANIZED HEALTH CARE EDUCATION/TRAINING PROGRAM

## 2024-01-11 PROCEDURE — 1125F AMNT PAIN NOTED PAIN PRSNT: CPT | Performed by: STUDENT IN AN ORGANIZED HEALTH CARE EDUCATION/TRAINING PROGRAM

## 2024-01-11 PROCEDURE — 3078F DIAST BP <80 MM HG: CPT | Performed by: STUDENT IN AN ORGANIZED HEALTH CARE EDUCATION/TRAINING PROGRAM

## 2024-01-11 ASSESSMENT — PATIENT HEALTH QUESTIONNAIRE - PHQ9
2. FEELING DOWN, DEPRESSED OR HOPELESS: NOT AT ALL
SUM OF ALL RESPONSES TO PHQ9 QUESTIONS 1 & 2: 0
1. LITTLE INTEREST OR PLEASURE IN DOING THINGS: NOT AT ALL

## 2024-01-11 NOTE — PROGRESS NOTES
Bob Aceves is a 78 y.o. year old male p/w left calf pain since November 2023.    14/14 systems reviewed and negative other than what is listed in the history of present illness        Past Medical History:   Diagnosis Date    Other nonthrombocytopenic purpura (CMS/HCC) 08/20/2019    Senile purpura    Personal history of malignant neoplasm of prostate 12/10/2020    History of malignant neoplasm of prostate    Personal history of other diseases of the respiratory system     History of bronchitis    Personal history of other diseases of the respiratory system     History of upper respiratory infection    Personal history of other endocrine, nutritional and metabolic disease 12/18/2019    History of hyperlipidemia       Past Surgical History:   Procedure Laterality Date    APPENDECTOMY  10/20/2016    Appendectomy    CATARACT EXTRACTION  10/20/2016    Cataract Surgery    CORONARY ARTERY BYPASS GRAFT  10/20/2016    CABG    PILONIDAL CYST DRAINAGE  10/20/2016    Pilonidal Cyst Resection           Current Outpatient Medications:     albuterol 90 mcg/actuation inhaler, USE 1 TO 2 INHALATIONS BY  MOUTH EVERY 4 TO 6 HOURS AS NEEDED, Disp: 18 g, Rfl: 1    ascorbic acid (Vitamin C) 1,000 mg tablet, Take 1 tablet (1,000 mg) by mouth once daily., Disp: , Rfl:     aspirin 81 mg EC tablet, Take 1 tablet (81 mg) by mouth once daily., Disp: , Rfl:     atorvastatin (Lipitor) 10 mg tablet, Take 1 tablet (10 mg) by mouth once daily., Disp: , Rfl:     blood sugar diagnostic (OneTouch Verio test strips) strip, Use to monitor glucose TID., Disp: 300 each, Rfl: 3    blood-glucose meter misc, Use to monitor glucose., Disp: 1 each, Rfl: 0    cholecalciferol (Vitamin D-3) 25 MCG (1000 UT) tablet, Take 1 tablet (25 mcg) by mouth once daily., Disp: , Rfl:     cyanocobalamin (Vitamin B-12) 1,000 mcg tablet, Take 1 tablet (1,000 mcg) by mouth once daily., Disp: , Rfl:     diclofenac sodium (Voltaren XR) 100 mg 24 hr tablet, Take 1 tablet (100 mg)  "by mouth 2 times a day as needed. Take with food, Disp: , Rfl:     empagliflozin (Jardiance) 10 mg, Take 1 tablet (10 mg) by mouth once daily., Disp: 90 tablet, Rfl: 3    fluticasone (Flonase) 50 mcg/actuation nasal spray, Administer 1 spray into affected nostril(s) once daily., Disp: , Rfl:     gabapentin (Neurontin) 100 mg capsule, Take 1 capsule (100 mg) by mouth 3 times a day., Disp: 90 capsule, Rfl: 11    glimepiride (Amaryl) 1 mg tablet, Take 1.5 tabs by mouth before breakfast and 1.5 tabs by mouth before dinner., Disp: 270 tablet, Rfl: 3    insulin glargine (Lantus Solostar U-100 Insulin) 100 unit/mL (3 mL) pen, Inject 6 Units under the skin once daily at bedtime. Take as directed per insulin instructions., Disp: 1.8 mL, Rfl: 11    lancets (OneTouch Delica Plus Lancet) 30 gauge misc, CHECK BLOOD SUGAR 3 TIMES DAILY  AS DIRECTED, Disp: 300 each, Rfl: 2    lisinopril 10 mg tablet, Take 1.5 (one and one-half) Tablets TWICE DAILY., Disp: , Rfl:     metFORMIN (Glucophage) 1,000 mg tablet, TAKE 1 TABLET TWO TIMES DAILY, Disp: 180 tablet, Rfl: 3    metoprolol tartrate (Lopressor) 25 mg tablet, Take 1 tablet (25 mg) by mouth 2 times a day., Disp: , Rfl:     multivitamin-min-iron-FA-vit K (Adults Multivitamin) 18 mg iron-400 mcg-25 mcg tablet, Take 1 tablet by mouth once daily., Disp: , Rfl:     pen needle, diabetic 31 gauge x 5/16\" needle, Use to inject 1-4 times daily as directed., Disp: 100 each, Rfl: 11    SITagliptin phosphate (Januvia) 100 mg tablet, Take 1 tablet (100 mg) by mouth once daily., Disp: 90 tablet, Rfl: 3    Spiriva Respimat 2.5 mcg/actuation inhaler, INHALE 2 PUFFS ONCE DAILY, Disp: , Rfl:       There were no vitals filed for this visit.  Objective   General: Well developed, awake/alert/oriented x3, no distress, alert and cooperative  Skin: Warm and dry, no lesions, no rashes  ENMT: Mucous membranes moist, no apparent injury, no lesions seen  Head/Neck: Neck Supple, no apparent " injury  Respiratory/Thorax: Normal breath sounds with good chest expansion, thorax symmetric  Cardiovascular: No pitting edema, no JVD    Motor Strength: 5/5 Throughout all extremities    Muscle Bulk: Normal and symmetric in all extremities    Posture:   -- Cervical: Normal  -- Thoracic: Normal  -- Lumbar : Normal  Paraspinal muscle spasm/tenderness absent.     Sensation: intact to light touch     Relevant Results    Xray L-spine with mild coronal deformity MRI L-spine: L1-3 central stenosis with multi-level foraminal stenosis       Problem List Items Addressed This Visit       Back pain     Other Visit Diagnoses       Weakness of left lower extremity                   Assessment/Plan       Mr. Aceves is a very  nice 77 yo man with history of COPD and DMII (last HbA1c 8) presenting with several months of left calf pain. The patient states that the pain is worst in the morning and in changing positions. He states that it improves mildly when ambulating. He denies any bowel/bladder symptoms or any right leg symptoms.     We reviewed his MRI together and we discussed that while he has narrowing in his canal, his current symptoms do not seem consistent with neurogenic claudication and is probably more reflective of a radiculopathy. He has seen some improvement with gabapentin and has started PT yesterday. He has not had any JAVIER.     I will refer the patient to aqua therapy in addition and he would also like a referral for acupuncture.  The patient can call our office again should his pain worsen or if the non-surgical therapies do not alleviate his issues.     Pepper Acevedo MD    of Neurosurgery   [facility] [place]   [facility]   Office: [phone]  Fax: [phone]

## 2024-01-19 ENCOUNTER — TREATMENT (OUTPATIENT)
Dept: PHYSICAL THERAPY | Facility: CLINIC | Age: 79
End: 2024-01-19
Payer: MEDICARE

## 2024-01-19 DIAGNOSIS — M25.69 BACK STIFFNESS: Primary | ICD-10-CM

## 2024-01-19 DIAGNOSIS — M54.9 BACK PAIN: ICD-10-CM

## 2024-01-19 DIAGNOSIS — M79.604 RIGHT LEG PAIN: ICD-10-CM

## 2024-01-19 ASSESSMENT — PAIN SCALES - GENERAL: PAINLEVEL_OUTOF10: 7

## 2024-01-19 ASSESSMENT — PAIN - FUNCTIONAL ASSESSMENT: PAIN_FUNCTIONAL_ASSESSMENT: 0-10

## 2024-01-19 NOTE — PROGRESS NOTES
Physical Therapy    Physical Therapy Treatment      Patient Name: Bob Aceves  MRN: [id number]  Today's Date: 1/19/2024  Time Calculation  Start Time: 1530  Stop Time: 1615  Time Calculation (min): 45 min    Assessment:    The patient presents to Physical Therapy with signs and symptoms consistent with the medical diagnosis of lumbar radiculopathy and lumbar degenerative changes. Key impairments include: nerve pain at distributions of common fibular and superficial fibular nerve, stiffness with lumbar AROM, weakness with L DF and great toe extension, and difficulty with functional activities such as walking, groceries, and taking out trash.      Plan:   Continue per plan of care as tolerated. Patient to continue with HEP each day independently.      Current Problem:   1. Back stiffness        2. Back pain        3. Right leg pain              Subjective        Precautions:  Precautions  Precautions Comment: Fall around [place] 2023. Was using a cane. Leg gave out stepping off a curb/lip on the ground and fell, hiting face. Bruise to cheek and broke center of glasses. Did not seek medical care, no post concussive sx reported.    Pain:  Pain Assessment  Pain Assessment: 0-10  Pain Score: 7      Objective     Lumbar ROM (degrees):  Flexion: 75%  Extension: 25%  Sidebend R/L: 50% devan  Rotation R/L: 75%    Treatments:  Access Code: [email or web address]    Exercises  Nustep L1 4 min  Supine Lower Trunk Rotation   Supine Sciatic Nerve Glide

## 2024-01-21 DIAGNOSIS — J30.5 ALLERGIC RHINITIS DUE TO FOOD: ICD-10-CM

## 2024-01-22 RX ORDER — ALBUTEROL SULFATE 90 UG/1
AEROSOL, METERED RESPIRATORY (INHALATION)
Qty: 34 G | Refills: 4 | Status: SHIPPED | OUTPATIENT
Start: 2024-01-22

## 2024-01-24 ENCOUNTER — TREATMENT (OUTPATIENT)
Dept: PHYSICAL THERAPY | Facility: CLINIC | Age: 79
End: 2024-01-24
Payer: MEDICARE

## 2024-01-24 DIAGNOSIS — M79.609 PAIN IN LIMB: ICD-10-CM

## 2024-01-24 DIAGNOSIS — M54.16 LUMBAR RADICULOPATHY: ICD-10-CM

## 2024-01-24 DIAGNOSIS — M25.69 BACK STIFFNESS: Primary | ICD-10-CM

## 2024-01-24 DIAGNOSIS — M54.9 BACK PAIN: ICD-10-CM

## 2024-01-24 PROCEDURE — 97110 THERAPEUTIC EXERCISES: CPT | Mod: GP

## 2024-01-24 ASSESSMENT — PAIN - FUNCTIONAL ASSESSMENT: PAIN_FUNCTIONAL_ASSESSMENT: 0-10

## 2024-01-24 ASSESSMENT — PAIN SCALES - GENERAL: PAINLEVEL_OUTOF10: 7

## 2024-01-24 NOTE — PROGRESS NOTES
"Physical Therapy    Physical Therapy Treatment      Patient Name: Bob Aceves  MRN: [id number]  Today's Date: 1/24/2024  Time Calculation  Start Time: 1445  Stop Time: 1530  Time Calculation (min): 45 min    Assessment:    Pt has some difficulty tolerating standing exercises due to radicular sx, so aquatic therapy may help with developing tolerance for land exercise.     Plan:   Continue per plan of care as tolerated. Patient to continue with HEP each day independently.      Current Problem:   1. Back stiffness        2. Back pain        3. Pain in limb              Subjective    Woke up with 8/10 pain, now about a 6-7/10. No concerns with HEP.    Precautions:  Precautions  Precautions Comment: Fall around [place] 2023. Was using a cane. Leg gave out stepping off a curb/lip on the ground and fell, hiting face. Bruise to cheek and broke center of glasses. Did not seek medical care, no post concussive sx reported.    Pain:  Pain Assessment  Pain Assessment: 0-10  Pain Score: 7      Objective     Lumbar ROM (degrees):  Flexion: 75%  Extension: 25%  Sidebend R/L: 50% devan  Rotation R/L: 75%/100%    Cues for hip recruitment with side steps.     Treatments:  Access Code: [email or web address]    Exercises  Nustep L2 4 min  Supine Lower Trunk Rotation   Supine Sciatic Nerve Glide    Glute bridge NEXT VISIT  TA 3x10s  TA + march x 1 min  TA + leg ext x10 ea  TA + walkout 2x5  SB gentle distraction/ rotation  SB DKTC  HS stretch seated  DF/PF standing, DF seated Black TB  - Feet Together Balance at Counter Top Eyes Closed  - 1 x daily - 7 x weekly - 3 sets - 10 reps  - Heel Toe Raises with Counter Support  - 1 x daily - 7 x weekly - 3 sets - 10 reps  - Side Stepping with Counter Support  - 1 x daily - 7 x weekly - 3 sets - 10 reps  - Standing March with Counter Support  - 1 x daily - 7 x weekly - 3 sets - 10 reps  STS 2x10  Standing cone tap  4\" step up  "

## 2024-01-26 ENCOUNTER — OFFICE VISIT (OUTPATIENT)
Dept: CARDIOLOGY | Facility: CLINIC | Age: 79
End: 2024-01-26
Payer: MEDICARE

## 2024-01-26 VITALS
DIASTOLIC BLOOD PRESSURE: 70 MMHG | HEART RATE: 68 BPM | BODY MASS INDEX: 24.08 KG/M2 | SYSTOLIC BLOOD PRESSURE: 126 MMHG | HEIGHT: 71 IN | WEIGHT: 172 LBS

## 2024-01-26 DIAGNOSIS — I10 ESSENTIAL HYPERTENSION: ICD-10-CM

## 2024-01-26 DIAGNOSIS — I25.10 CORONARY ARTERY DISEASE INVOLVING NATIVE CORONARY ARTERY OF NATIVE HEART WITHOUT ANGINA PECTORIS: Primary | ICD-10-CM

## 2024-01-26 DIAGNOSIS — E78.49 OTHER HYPERLIPIDEMIA: ICD-10-CM

## 2024-01-26 PROCEDURE — 1159F MED LIST DOCD IN RCRD: CPT | Performed by: INTERNAL MEDICINE

## 2024-01-26 PROCEDURE — 1125F AMNT PAIN NOTED PAIN PRSNT: CPT | Performed by: INTERNAL MEDICINE

## 2024-01-26 PROCEDURE — 3074F SYST BP LT 130 MM HG: CPT | Performed by: INTERNAL MEDICINE

## 2024-01-26 PROCEDURE — 1123F ACP DISCUSS/DSCN MKR DOCD: CPT | Performed by: INTERNAL MEDICINE

## 2024-01-26 PROCEDURE — 1036F TOBACCO NON-USER: CPT | Performed by: INTERNAL MEDICINE

## 2024-01-26 PROCEDURE — 99214 OFFICE O/P EST MOD 30 MIN: CPT | Performed by: INTERNAL MEDICINE

## 2024-01-26 PROCEDURE — 3078F DIAST BP <80 MM HG: CPT | Performed by: INTERNAL MEDICINE

## 2024-01-26 PROCEDURE — 1160F RVW MEDS BY RX/DR IN RCRD: CPT | Performed by: INTERNAL MEDICINE

## 2024-01-26 PROCEDURE — 93000 ELECTROCARDIOGRAM COMPLETE: CPT | Performed by: INTERNAL MEDICINE

## 2024-01-26 NOTE — PATIENT INSTRUCTIONS

## 2024-01-26 NOTE — PROGRESS NOTES
"Chief Complaint:   Please see below.     History Of Present Illness:    Bob Aceves is a 78 y.o. male presenting with CAD.    This 78-year-old man with hypertension, diabetes mellitus, hyperlipidemia, and a prior history of tobacco abuse is status post prior MI and CABG in 2014. He is functionally class I, and his stress test in September 2019 disclosed no ischemia at 7 METs of exercise.His most recent echo disclosed an EF of 50-55%, with a n RVSP of 21 mm in November 2020. He has mild ectasia of the ascending aorta measuring 3.8-3.9 cm by echo in November 2020.     The patient denies chest discomfort, dyspnea, palpitations, orthopnea, PND, syncope, and near syncope.  He has slowed down a bit over the past year due to problems with back pain and sciatica.  He can walk about 100 feet with his walker before having to pause with sciatica pain.       Last Recorded Vitals:  Vitals:    01/26/24 1100   BP: 126/70   BP Location: Left arm   Patient Position: Sitting   Pulse: 68   Weight: 78 kg (172 lb)   Height: 1.803 m (5' 11\")   Body mass index is 23.99 kg/m².      Past Medical History:  He has a past medical history of Other nonthrombocytopenic purpura (CMS/HCC) (08/20/2019), Personal history of malignant neoplasm of prostate (12/10/2020), Personal history of other diseases of the respiratory system, Personal history of other diseases of the respiratory system, and Personal history of other endocrine, nutritional and metabolic disease (12/18/2019).    Past Surgical History:  He has a past surgical history that includes Coronary artery bypass graft (10/20/2016); Cataract extraction (10/20/2016); Pilonidal cyst drainage (10/20/2016); and Appendectomy (10/20/2016).      Social History:  He reports that he quit smoking about 10 years ago. His smoking use included cigarettes. He has never used smokeless tobacco. He reports that he does not currently use alcohol. He reports that he does not use drugs.    Family " "History:  Family History   Problem Relation Name Age of Onset    Alzheimer's disease Mother      Heart attack Father      Diabetes Paternal Grandfather          type 2 DM        Allergies:  Sulfamethoxazole-trimethoprim    Outpatient Medications:  Current Outpatient Medications   Medication Instructions    albuterol 90 mcg/actuation inhaler USE 1 TO 2 INHALATIONS BY MOUTH  EVERY 4 TO 6 HOURS AS NEEDED    ascorbic acid (Vitamin C) 1,000 mg tablet 1 tablet, oral, Daily    aspirin 81 mg EC tablet 1 tablet, oral, Daily    atorvastatin (Lipitor) 10 mg tablet 1 tablet, oral, Daily    blood sugar diagnostic (OneTouch Verio test strips) strip Use to monitor glucose TID.    blood-glucose meter misc Use to monitor glucose.    cholecalciferol (Vitamin D-3) 25 MCG (1000 UT) tablet 1 tablet, oral, Daily    cyanocobalamin (Vitamin B-12) 1,000 mcg tablet 1 tablet, oral, Daily    empagliflozin (JARDIANCE) 10 mg, oral, Daily    fluticasone (Flonase) 50 mcg/actuation nasal spray 1 spray, nasal, Daily    gabapentin (NEURONTIN) 100 mg, oral, 3 times daily    glimepiride (Amaryl) 1 mg tablet Take 1.5 tabs by mouth before breakfast and 1.5 tabs by mouth before dinner.    insulin glargine (LANTUS SOLOSTAR U-100 INSULIN) 6 Units, subcutaneous, Nightly, Take as directed per insulin instructions.    lancets (OneTouch Delica Plus Lancet) 30 gauge misc CHECK BLOOD SUGAR 3 TIMES DAILY  AS DIRECTED    lisinopril 10 mg tablet Take 1.5 (one and one-half) Tablets TWICE DAILY.    metFORMIN (Glucophage) 1,000 mg tablet TAKE 1 TABLET TWO TIMES DAILY    metoprolol tartrate (Lopressor) 25 mg tablet 1 tablet, oral, 2 times daily    multivitamin-min-iron-FA-vit K (Adults Multivitamin) 18 mg iron-400 mcg-25 mcg tablet 1 tablet, oral, Daily    pen needle, diabetic 31 gauge x 5/16\" needle Use to inject 1-4 times daily as directed.    SITagliptin phosphate (JANUVIA) 100 mg, oral, Daily    Spiriva Respimat 2.5 mcg/actuation inhaler INHALE 2 PUFFS ONCE DAILY "       Physical Exam:  GENERAL:  pleasant 78 year-old  HEENT: No xanthelasma  NECK: Supple, no palpable adenopathy or thyromegaly  CHEST: Clear to auscultation, respiratory effort unlabored  CARDIAC: RRR, normal S1 and S2, no audible murmur, rub, gallop, carotids are brisk, PMI is not displaced  ABD: Active bowel sounds, nontender, no organomegaly, no evidence of ascites  EXT: No clubbing, cyanosis, edema, or tenderness  NEURO: Awake, alert, appropriate, speech is fluent       Last Labs:  CBC -  Lab Results   Component Value Date    WBC 8.7 11/30/2023    HGB 14.8 11/30/2023    HCT 44.7 11/30/2023    MCV 96 11/30/2023     11/30/2023       CMP -  Lab Results   Component Value Date    CALCIUM 9.4 11/30/2023    PROT 7.4 11/30/2023    ALBUMIN 4.7 11/30/2023    AST 11 11/30/2023    ALT 11 11/30/2023    ALKPHOS 69 11/30/2023    BILITOT 0.5 11/30/2023       LIPID PANEL -   Lab Results   Component Value Date    CHOL 86 11/30/2023    TRIG 87 11/30/2023    HDL 33.2 11/30/2023    CHHDL 2.6 11/30/2023    LDLF 43 08/29/2023    VLDL 17 11/30/2023    NHDL 53 11/30/2023       RENAL FUNCTION PANEL -   Lab Results   Component Value Date    GLUCOSE 245 (H) 11/30/2023     11/30/2023    K 4.6 11/30/2023     11/30/2023    CO2 28 11/30/2023    ANIONGAP 14 11/30/2023    BUN 33 (H) 11/30/2023    CREATININE 1.22 11/30/2023    GFRMALE 64 08/29/2023    CALCIUM 9.4 11/30/2023    ALBUMIN 4.7 11/30/2023        Lab Results   Component Value Date    HGBA1C 8.0 (H) 11/30/2023         Lab review: I have Chemistry CMP:   Lab Results   Component Value Date    ALBUMIN 4.7 11/30/2023    CALCIUM 9.4 11/30/2023    CO2 28 11/30/2023    CREATININE 1.22 11/30/2023    GLUCOSE 245 (H) 11/30/2023    BILITOT 0.5 11/30/2023    PROT 7.4 11/30/2023    ALT 11 11/30/2023    AST 11 11/30/2023    ALKPHOS 69 11/30/2023   , Chemistry BMP   Lab Results   Component Value Date    GLUCOSE 245 (H) 11/30/2023    CALCIUM 9.4 11/30/2023    CO2 28 11/30/2023     CREATININE 1.22 11/30/2023   , CBC:  Lab Results   Component Value Date    WBC 8.7 11/30/2023    RBC 4.67 11/30/2023    HGB 14.8 11/30/2023    HCT 44.7 11/30/2023    MCV 96 11/30/2023    MCH 31.7 11/30/2023    MCHC 33.1 11/30/2023    RDW 12.9 11/30/2023    NRBC 0.0 11/30/2023   , and Lipids:   Lab Results   Component Value Date    CHOL 86 11/30/2023    HDL 33.2 11/30/2023    LDLCALC 35 11/30/2023    TRIG 87 11/30/2023       Assessment/Plan   Problem List Items Addressed This Visit          Cardiac and Vasculature    CAD (coronary artery disease) - Primary    Relevant Orders    Follow Up In Cardiology    ECG 12 lead (Clinic Performed) (Completed)    Hyperlipidemia     Other Visit Diagnoses       Essential hypertension              CAD: The patient has stable, functional class I CAD, and is doing well.  No additional testing is necessary at present. Important aspects of lifestyle modification were discussed in detail with the patient.  Recent labs and testing have been reviewed.  HTN:  BP is well controlled.   We discussed sodium restriction, lifestyle modification, and the DASH diet.  I advised the patient to check BPs at home daily, and to contact me with an update in one month.      Joe Orourke MD

## 2024-01-31 ENCOUNTER — APPOINTMENT (OUTPATIENT)
Dept: PHYSICAL THERAPY | Facility: CLINIC | Age: 79
End: 2024-01-31
Payer: MEDICARE

## 2024-02-01 ENCOUNTER — TREATMENT (OUTPATIENT)
Dept: PHYSICAL THERAPY | Facility: CLINIC | Age: 79
End: 2024-02-01
Payer: MEDICARE

## 2024-02-01 DIAGNOSIS — M54.16 LUMBAR RADICULOPATHY: ICD-10-CM

## 2024-02-01 DIAGNOSIS — E11.9 DM TYPE 2 WITHOUT RETINOPATHY (MULTI): ICD-10-CM

## 2024-02-01 PROCEDURE — 97113 AQUATIC THERAPY/EXERCISES: CPT | Mod: GP,CQ

## 2024-02-01 NOTE — PROGRESS NOTES
"Patient Name: Bob Aceves  MRN: [id number]  Today's Date: 2/2/2024  Time Calculation  Start Time: 1400  Stop Time: 1425  Time Calculation (min): 25 min  INSURANCE :   #4 of 10     Subjective:  States that he is using WW \"cause wife told him he should\"      Objective:  Gait on pool deck WW good technique  Instructed in initial skilled aquatic there ex     Assessment:   Pt required general cues for proper form and technique with aquatic exercises. Decreased balance noted with maintaining trunk posture . Requires UE support on wall for aquatic gait and 2 UE support with strengthening /stretching exercises.    Plan:   Ask response and progress as able.    Treatment :   AQUATIC THERAPEUTIC EXERCISE 82670 25 Minutes 2 units   Aquatic gait forward/Bwd/Lateral Wall support 2 x each   March  1 minute   HS/GS Stretches Gastroc only today 2 x30   Hip 3 way 2 x10   Squats x20  DLS 3 way  Level1 trunk against wall   Trunk Rotation with noodle x10   Noodle Pull down x10   Rows Level 5 x10 trunk against wall   SKTC 2 x30             Current Problem:  1. Lumbar radiculopathy  Follow Up In Physical Therapy              Pain:  5/10   Location: LB        Precautions: NO recent falls reported since initially reported at [facility].  Patient identified as high fall risk.    "

## 2024-02-02 ENCOUNTER — TELEPHONE (OUTPATIENT)
Dept: PRIMARY CARE | Facility: CLINIC | Age: 79
End: 2024-02-02
Payer: MEDICARE

## 2024-02-02 NOTE — TELEPHONE ENCOUNTER
Called pt regarding blood glucose log (see scanned doc).  Spouse answered d/t pt currently driving.  Made aware no new changes, per Dr. Beltrán.

## 2024-02-07 ENCOUNTER — TREATMENT (OUTPATIENT)
Dept: PHYSICAL THERAPY | Facility: CLINIC | Age: 79
End: 2024-02-07
Payer: MEDICARE

## 2024-02-07 DIAGNOSIS — M79.609 PAIN IN LIMB: ICD-10-CM

## 2024-02-07 DIAGNOSIS — M25.69 BACK STIFFNESS: Primary | ICD-10-CM

## 2024-02-07 DIAGNOSIS — M54.16 LUMBAR RADICULOPATHY: ICD-10-CM

## 2024-02-07 DIAGNOSIS — M54.9 BACK PAIN: ICD-10-CM

## 2024-02-07 PROCEDURE — 97140 MANUAL THERAPY 1/> REGIONS: CPT | Mod: GP

## 2024-02-07 PROCEDURE — 97110 THERAPEUTIC EXERCISES: CPT | Mod: GP

## 2024-02-07 ASSESSMENT — PAIN - FUNCTIONAL ASSESSMENT: PAIN_FUNCTIONAL_ASSESSMENT: 0-10

## 2024-02-07 ASSESSMENT — PAIN SCALES - GENERAL: PAINLEVEL_OUTOF10: 5 - MODERATE PAIN

## 2024-02-07 NOTE — PROGRESS NOTES
"Physical Therapy    Physical Therapy Treatment      Patient Name: Bob Aceves  MRN: [id number]  Today's Date: 2/8/2024   45 min timed    Assessment:  Pt demos some difficult with coordinating exercises and maintaining form, pt benefit's from guided PT to help with verbal cueing. We will assess if soft tissue work and distal nerve glides helped to free up tension.    Plan:   Continue per plan of care as tolerated. Patient to continue with HEP each day independently.      Current Problem:   1. Back stiffness        2. Back pain        3. Pain in limb        4. Lumbar radiculopathy  Follow Up In Physical Therapy          Subjective    Pt reports waking up with 6-7/10 pain. He reports stretching and HEP before getting up which helps.     Precautions:   Fall around [place] 2023. Was using a cane. Leg gave out stepping off a curb/lip on the ground and fell, hiting face. Bruise to cheek and broke center of glasses. Did not seek medical care, no post concussive sx reported.     Pain:   5/10      Objective     TA fair recruitment    Treatments:  Access Code: [email or web address]    Exercises  Nustep L2 4 min  Supine Lower Trunk Rotation   Supine Sciatic Nerve Glide R  Fibular nerve glides  Glute bridge  TA 3x10s  TA + march x 1 min  TA + leg ext x10 ea  TA + walkout 2x5  SB gentle distraction/ rotation  SB DKTC  HS stretch seated  STS 2x10  Standing cone tap  4\" step up    Not this visit:  DF/PF standing, DF seated Black TB  - Feet Together Balance at Counter Top Eyes Closed  - 1 x daily - 7 x weekly - 3 sets - 10 reps  - Heel Toe Raises with Counter Support  - 1 x daily - 7 x weekly - 3 sets - 10 reps  - Side Stepping with Counter Support  - 1 x daily - 7 x weekly - 3 sets - 10 reps  - Standing March with Counter Support  - 1 x daily - 7 x weekly - 3 sets - 10 reps    Manual:  STM posterior knee, lateral lower leg  "

## 2024-02-14 ENCOUNTER — TREATMENT (OUTPATIENT)
Dept: PHYSICAL THERAPY | Facility: CLINIC | Age: 79
End: 2024-02-14
Payer: MEDICARE

## 2024-02-14 ENCOUNTER — APPOINTMENT (OUTPATIENT)
Dept: PHYSICAL THERAPY | Facility: CLINIC | Age: 79
End: 2024-02-14
Payer: MEDICARE

## 2024-02-14 DIAGNOSIS — J30.9 ALLERGIC RHINITIS, UNSPECIFIED SEASONALITY, UNSPECIFIED TRIGGER: ICD-10-CM

## 2024-02-14 DIAGNOSIS — M54.16 LUMBAR RADICULOPATHY: ICD-10-CM

## 2024-02-14 PROCEDURE — 97113 AQUATIC THERAPY/EXERCISES: CPT | Mod: GP,CQ

## 2024-02-14 NOTE — PROGRESS NOTES
Patient Name: Bob Aceves  MRN: [id number]  Today's Date: 2/14/2024  Time Calculation  Start Time: 1400  Stop Time: 1430  Time Calculation (min): 30 min  INSURANCE :   #6 of 10     Subjective:  States that he does not feel confident yet to walk without WW due to L LE buckling .    Objective:  Aquatic gait no UE support in 4 feet water.    Assessment:   Improved stabilization today in aquatic setting.     Plan:   Progress as able .    Treatment :     AQUATIC THERAPEUTIC EXERCISE 22634 30 Minutes 2 units   Aquatic gait forward/Bwd/Lateral Wall support 3x each   March  1 minute   HS/GS Stretches Gastroc only today 2 x30   Hip 3 way 2 x10   Squats x20  DLS 3 way  Level1 trunk against wall   Trunk Rotation with noodle x10   Noodle Pull down x10   Rows Level 5 x10 trunk against wall   SKTC 2 x30          Current Problem:  1. Lumbar radiculopathy  Follow Up In Physical Therapy              Pain:  5-6/10    Location: L lateral /anterior quad        Precautions: No recent falls

## 2024-02-15 RX ORDER — FLUTICASONE PROPIONATE 50 MCG
1 SPRAY, SUSPENSION (ML) NASAL DAILY
Qty: 32 G | Refills: 3 | Status: SHIPPED | OUTPATIENT
Start: 2024-02-15

## 2024-02-21 ENCOUNTER — APPOINTMENT (OUTPATIENT)
Dept: PHYSICAL THERAPY | Facility: CLINIC | Age: 79
End: 2024-02-21
Payer: MEDICARE

## 2024-02-22 ENCOUNTER — TREATMENT (OUTPATIENT)
Dept: PHYSICAL THERAPY | Facility: CLINIC | Age: 79
End: 2024-02-22
Payer: MEDICARE

## 2024-02-22 DIAGNOSIS — M54.9 BACK PAIN: ICD-10-CM

## 2024-02-22 DIAGNOSIS — M25.69 BACK STIFFNESS: Primary | ICD-10-CM

## 2024-02-22 DIAGNOSIS — M54.16 LUMBAR RADICULOPATHY: ICD-10-CM

## 2024-02-22 PROCEDURE — 97140 MANUAL THERAPY 1/> REGIONS: CPT | Mod: GP

## 2024-02-22 PROCEDURE — 97110 THERAPEUTIC EXERCISES: CPT | Mod: GP

## 2024-02-22 ASSESSMENT — PAIN SCALES - GENERAL: PAINLEVEL_OUTOF10: 6

## 2024-02-22 ASSESSMENT — PAIN - FUNCTIONAL ASSESSMENT: PAIN_FUNCTIONAL_ASSESSMENT: 0-10

## 2024-02-22 NOTE — PROGRESS NOTES
"Physical Therapy    Physical Therapy Treatment      Patient Name: Bob Aceves  MRN: [id number]  Today's Date: 2/22/2024  Time Calculation  Start Time: 1515  Stop Time: 1600  Time Calculation (min): 45 min    Assessment:  Pt demos improvement in balance this date, and ambulated with minimal difficulty with SPC around clinic. Pt still limited with back pain but not exacerbated with today's activities.     Plan:   Continue per plan of care as tolerated. Patient to continue with HEP each day independently.      Current Problem:   1. Back stiffness        2. Back pain            Subjective    Pt reports doing his HEP but no major changes in symptoms recently.     Precautions:  Precautions  Precautions Comment: Fall around [place] 2023. Was using a cane. Leg gave out stepping off a curb/lip on the ground and fell, hiting face. Bruise to cheek and broke center of glasses. Did not seek medical care, no post concussive sx reported.    Pain:  Pain Assessment  Pain Assessment: 0-10  Pain Score: 5 - Moderate pain      Objective   Tenderness initially with soft tissue work, resolved within 1 min    Treatments:  Access Code: [email or web address]    Exercises  Nustep L2 4 min  Supine Lower Trunk Rotation   Supine Sciatic Nerve Glide L assisted  Fibular nerve glides  Glute bridge  TA 3x10s  TA + march x 1 min  TA + leg ext x10 ea  TA + walkout 2x5  SB gentle distraction/ rotation  SB DKTC  SB lumbar stretch seated  HS stretch seated  STS 2x10  Standing cone tap  4\" step up  Step calf stretch  DF/PF standing, DF seated Black TB  - Feet Together Balance at Counter Top Eyes Closed  - 1 x daily - 7 x weekly - 3 sets - 10 reps  - Heel Toe Raises with Counter Support  - 1 x daily - 7 x weekly - 3 sets - 10 reps  - Side Stepping with Counter Support  - 1 x daily - 7 x weekly - 3 sets - 10 reps  - Standing March with Counter Support  - 1 x daily - 7 x weekly - 3 sets - 10 reps    Manual:  STM posterior knee, lateral lower leg  "

## 2024-02-28 ENCOUNTER — TREATMENT (OUTPATIENT)
Dept: PHYSICAL THERAPY | Facility: CLINIC | Age: 79
End: 2024-02-28
Payer: MEDICARE

## 2024-02-28 ENCOUNTER — LAB (OUTPATIENT)
Dept: LAB | Facility: LAB | Age: 79
End: 2024-02-28
Payer: MEDICARE

## 2024-02-28 ENCOUNTER — APPOINTMENT (OUTPATIENT)
Dept: PHYSICAL THERAPY | Facility: CLINIC | Age: 79
End: 2024-02-28
Payer: MEDICARE

## 2024-02-28 DIAGNOSIS — M54.16 LUMBAR RADICULOPATHY: Primary | ICD-10-CM

## 2024-02-28 DIAGNOSIS — E11.9 TYPE 2 DIABETES MELLITUS WITHOUT COMPLICATION, UNSPECIFIED WHETHER LONG TERM INSULIN USE (MULTI): ICD-10-CM

## 2024-02-28 LAB
ALBUMIN SERPL BCP-MCNC: 4.7 G/DL (ref 3.4–5)
ALP SERPL-CCNC: 65 U/L (ref 33–136)
ALT SERPL W P-5'-P-CCNC: 14 U/L (ref 10–52)
ANION GAP SERPL CALC-SCNC: 12 MMOL/L (ref 10–20)
AST SERPL W P-5'-P-CCNC: 14 U/L (ref 9–39)
BILIRUB SERPL-MCNC: 0.6 MG/DL (ref 0–1.2)
BUN SERPL-MCNC: 26 MG/DL (ref 6–23)
CALCIUM SERPL-MCNC: 10.2 MG/DL (ref 8.6–10.3)
CHLORIDE SERPL-SCNC: 100 MMOL/L (ref 98–107)
CHOLEST SERPL-MCNC: 86 MG/DL (ref 0–199)
CHOLESTEROL/HDL RATIO: 2.7
CO2 SERPL-SCNC: 31 MMOL/L (ref 21–32)
CREAT SERPL-MCNC: 1.18 MG/DL (ref 0.5–1.3)
EGFRCR SERPLBLD CKD-EPI 2021: 64 ML/MIN/1.73M*2
ERYTHROCYTE [DISTWIDTH] IN BLOOD BY AUTOMATED COUNT: 13.2 % (ref 11.5–14.5)
EST. AVERAGE GLUCOSE BLD GHB EST-MCNC: 189 MG/DL
GLUCOSE SERPL-MCNC: 126 MG/DL (ref 74–99)
HBA1C MFR BLD: 8.2 %
HCT VFR BLD AUTO: 43.2 % (ref 41–52)
HDLC SERPL-MCNC: 31.3 MG/DL
HGB BLD-MCNC: 14.4 G/DL (ref 13.5–17.5)
LDLC SERPL CALC-MCNC: 30 MG/DL
MCH RBC QN AUTO: 32.1 PG (ref 26–34)
MCHC RBC AUTO-ENTMCNC: 33.3 G/DL (ref 32–36)
MCV RBC AUTO: 96 FL (ref 80–100)
NON HDL CHOLESTEROL: 55 MG/DL (ref 0–149)
NRBC BLD-RTO: 0 /100 WBCS (ref 0–0)
PLATELET # BLD AUTO: 171 X10*3/UL (ref 150–450)
POTASSIUM SERPL-SCNC: 4.9 MMOL/L (ref 3.5–5.3)
PROT SERPL-MCNC: 7.1 G/DL (ref 6.4–8.2)
RBC # BLD AUTO: 4.48 X10*6/UL (ref 4.5–5.9)
SODIUM SERPL-SCNC: 138 MMOL/L (ref 136–145)
TRIGL SERPL-MCNC: 124 MG/DL (ref 0–149)
VIT B12 SERPL-MCNC: 1412 PG/ML (ref 211–911)
VLDL: 25 MG/DL (ref 0–40)
WBC # BLD AUTO: 9.3 X10*3/UL (ref 4.4–11.3)

## 2024-02-28 PROCEDURE — 80061 LIPID PANEL: CPT

## 2024-02-28 PROCEDURE — 36415 COLL VENOUS BLD VENIPUNCTURE: CPT

## 2024-02-28 PROCEDURE — 82607 VITAMIN B-12: CPT

## 2024-02-28 PROCEDURE — 97113 AQUATIC THERAPY/EXERCISES: CPT | Mod: GP,CQ

## 2024-02-28 PROCEDURE — 83036 HEMOGLOBIN GLYCOSYLATED A1C: CPT

## 2024-02-28 PROCEDURE — 85027 COMPLETE CBC AUTOMATED: CPT

## 2024-02-28 PROCEDURE — 80053 COMPREHEN METABOLIC PANEL: CPT

## 2024-02-28 NOTE — PROGRESS NOTES
Patient Name: Bob Aceves  MRN: [id number]  Today's Date: 2/28/2024  Time Calculation  Start Time: 1315  Stop Time: 1345  Time Calculation (min): 30 min  INSURANCE :   #8 of 10      Subjective:  Reports that he feels steadier since starting PT and is able to ambulate with decreased assistive device in home.    Objective:  Gait WW entering pool.   No assistive device with walking over to pool to enter . SBA     Assessment:   Increased lateral trunk sway slightly without use of assistive device however does demo improved balance and control. Improved ability to complete aquatic program as well with decreased UE support.    Plan:   2 more visits remain on POC     Treatment :   AQUATIC THERAPEUTIC EXERCISE 28411 30 Minutes 2 units   Aquatic gait forward/Bwd/Lateral Wall No UE assist 3x each   March  1 minute   HS/GS Stretches Gastroc only today 2 x30   Hip 3 way 2 x10   Squats x20  DLS 3 way  Level1 trunk against wall   Trunk Rotation with noodle x10   Noodle Pull down x10   Rows Level 5 x10 trunk against wall   SKTC 2 x30  HSC 20 x       Current Problem:  1. Lumbar radiculopathy                Pain:  4/10    Location: L quad             Precautions: No recent falls

## 2024-03-06 ENCOUNTER — TREATMENT (OUTPATIENT)
Dept: PHYSICAL THERAPY | Facility: CLINIC | Age: 79
End: 2024-03-06
Payer: MEDICARE

## 2024-03-06 DIAGNOSIS — M25.69 BACK STIFFNESS: Primary | ICD-10-CM

## 2024-03-06 DIAGNOSIS — M79.609 PAIN IN LIMB: ICD-10-CM

## 2024-03-06 DIAGNOSIS — M54.9 BACK PAIN: ICD-10-CM

## 2024-03-06 DIAGNOSIS — M54.16 LUMBAR RADICULOPATHY: ICD-10-CM

## 2024-03-06 PROCEDURE — 97110 THERAPEUTIC EXERCISES: CPT | Mod: GP

## 2024-03-06 ASSESSMENT — PAIN SCALES - GENERAL: PAINLEVEL_OUTOF10: 6

## 2024-03-06 ASSESSMENT — PAIN - FUNCTIONAL ASSESSMENT: PAIN_FUNCTIONAL_ASSESSMENT: 0-10

## 2024-03-06 NOTE — PROGRESS NOTES
Physical Therapy    Physical Therapy Treatment      Patient Name: Bob Aceves  MRN: [id number]  Today's Date: 3/6/2024  Time Calculation  Start Time: 1300  Stop Time: 1345  Time Calculation (min): 45 min    Assessment:  Pt demos improvement in lumbar flexion/extension, improved L hip abduction, slight improvement on subjective OM, and improved gait. Pt still demo some neurogenic weakness which has not improve in the water, but patient is making progress which could warrant more sessions to develop further strength and balance.     Plan:   Pt will be seen 1-2x/week for 10 visits. Potential to achieve rehab goals is good. Plan of care was developed with input and agreement by the patient.    Treatment may include: Therapeutic Exercise (PROM, AA/AROM, Flexibility, Strengthening, Stabilization, HEP instruction). Therapeutic Activities (Transfers, Body Mechanics, Work Related Activities, Closed Chain, Agility and Power). Manual Techniques (Soft tissue Mobilization, Joint Mobilization/Distraction, Muscle Energy Techniques, Lymphatic Drainage, Dry Needling). Neuromuscular Reeducation (Postural Training, Balance/Proprioception, Relaxation Techniques). Biofeedback. Aquatic Exercise. Modalities: Ultrasound, Cryotherapy, Vasopneumatic with or without Cryotherapy. Electrical Stimulation (TENS/IFC/ Premodulated for pain relief, NMES for Muscle reeducation). Gait Training. Orthotic Fit and Training. Strapping, Kinesiotaping.         Current Problem:   1. Back stiffness        2. Back pain        3. Pain in limb            Subjective    Pt reports 40-50% subjective improvement since initiating therapy.     Precautions:  Precautions  Precautions Comment: Fall around [place] 2023. Was using a cane. Leg gave out stepping off a curb/lip on the ground and fell, hiting face. Bruise to cheek and broke center of glasses. Did not seek medical care, no post concussive sx reported.    Pain:  Pain Assessment  Pain Assessment: 0-10  Pain  "Score: 6      Objective   BLAS 40%    Lumbar ROM (degrees):  Flexion: 75%  Extension: 25%  Sidebend R/L: 50% devan  Rotation R/L: 75%  HIP AROM (degrees):  Hip Internal Rotation R/L: moderate restriction  Hip External Rotation R/L:  no restriction     Flexibility (degrees):   Hamstring 90/90 position R/L:30 devan     Strength Testing:  Hip Abduction R/L: 4/3+  Hip Extension R/L:  3/2  Hip Flexion R/L: 4-/4-  Knee extension R/L:  5/5  Knee Flexion R/L: 4+/4+  DF R/L: 4+/4  Great toe extension R/L: 5/4     Posture:  Sitting:  PPT     Observation:  Spasm/Tenderness: none  Gait: devan outtoeing, genu varum, equal stride length, use of SPC    ** Pt negotiated > 13 stairs with this PT and needed occasional reminding to lead with weaker LLE, no LOB    Treatments:  Access Code: [email or web address]    Exercises  Nustep L2 4 min NV  Supine Lower Trunk Rotation   Supine Sciatic Nerve Glide L assisted  Fibular nerve glides  Glute bridge  TA 3x10s  TA + march x 1 min  TA + leg ext x10 ea  TA + walkout 2x5  Re-assessment    Not this visit:  SB gentle distraction/ rotation  SB DKTC  SB lumbar stretch seated  HS stretch seated  STS 2x10  Standing cone tap  4\" step up  Step calf stretch  DF/PF standing, DF seated Black TB  - Feet Together Balance at Counter Top Eyes Closed  - 1 x daily - 7 x weekly - 3 sets - 10 reps  - Heel Toe Raises with Counter Support  - 1 x daily - 7 x weekly - 3 sets - 10 reps  - Side Stepping with Counter Support  - 1 x daily - 7 x weekly - 3 sets - 10 reps  - Standing March with Counter Support  - 1 x daily - 7 x weekly - 3 sets - 10 reps    Manual:  Not this visit:  STM posterior knee, lateral lower leg    Goals:  Lumbar goals  Patient will verbalize pain (0-10) less than or equal to  0/10 at rest, and  2 /10 with activity. NOT MET     Modified Oswestry Disability Questionnaire (MODQ) rating score less than or equal to 10% to demonstrate overall improved functional abilities. NOT MET     Lumbar AROM will be greater than or " equal to: Flexion , Extension and Left and Right Side Bending 100%, to improve joint mechanics during ADL's. NOT MET     Patient will correctly perform home exercise program to progress current functional status. PARTIALLY MET     Strength will improve to greater than or equal to 4/5 for hip abduction/extension with improved recruitment to improve joint stability during ADL's NOT MET     Hamstring flexibility will improve to 20 ° or better in 90°/90° position. NOT MET     Rectus flexibility in prone will be greater than or equal to 120° without spine motion to decrease strain on the spine in standing and walking. NT     Patient will have improved lumbar and pelvic alignment consistently to decrease pain in the lumbosacral complex. PARTIALLY MET    Pt will score</= 19 on Tinetti to reduce to low fall risk.

## 2024-03-07 ENCOUNTER — OFFICE VISIT (OUTPATIENT)
Dept: PRIMARY CARE | Facility: CLINIC | Age: 79
End: 2024-03-07
Payer: MEDICARE

## 2024-03-07 VITALS
OXYGEN SATURATION: 100 % | HEIGHT: 71 IN | BODY MASS INDEX: 24.36 KG/M2 | DIASTOLIC BLOOD PRESSURE: 78 MMHG | WEIGHT: 174 LBS | SYSTOLIC BLOOD PRESSURE: 126 MMHG | RESPIRATION RATE: 16 BRPM | TEMPERATURE: 98.1 F | HEART RATE: 80 BPM

## 2024-03-07 DIAGNOSIS — Z95.1 S/P CABG (CORONARY ARTERY BYPASS GRAFT): ICD-10-CM

## 2024-03-07 DIAGNOSIS — E11.9 TYPE 2 DIABETES MELLITUS WITHOUT COMPLICATION, UNSPECIFIED WHETHER LONG TERM INSULIN USE (MULTI): ICD-10-CM

## 2024-03-07 DIAGNOSIS — E78.2 MIXED HYPERLIPIDEMIA: ICD-10-CM

## 2024-03-07 DIAGNOSIS — E11.9 DM TYPE 2 WITHOUT RETINOPATHY (MULTI): ICD-10-CM

## 2024-03-07 DIAGNOSIS — I10 PRIMARY HYPERTENSION: ICD-10-CM

## 2024-03-07 DIAGNOSIS — I71.21 ANEURYSM OF ASCENDING AORTA WITHOUT RUPTURE (CMS-HCC): ICD-10-CM

## 2024-03-07 DIAGNOSIS — M48.062 SPINAL STENOSIS OF LUMBAR REGION WITH NEUROGENIC CLAUDICATION: ICD-10-CM

## 2024-03-07 DIAGNOSIS — J44.1 ACUTE EXACERBATION OF CHRONIC OBSTRUCTIVE PULMONARY DISEASE (MULTI): Primary | ICD-10-CM

## 2024-03-07 DIAGNOSIS — C61 MALIGNANT NEOPLASM OF PROSTATE (MULTI): ICD-10-CM

## 2024-03-07 DIAGNOSIS — M79.662 PAIN OF LEFT LOWER LEG: ICD-10-CM

## 2024-03-07 PROBLEM — M79.604 RIGHT LEG PAIN: Status: RESOLVED | Noted: 2023-06-30 | Resolved: 2024-03-07

## 2024-03-07 PROCEDURE — 3078F DIAST BP <80 MM HG: CPT | Performed by: INTERNAL MEDICINE

## 2024-03-07 PROCEDURE — 1036F TOBACCO NON-USER: CPT | Performed by: INTERNAL MEDICINE

## 2024-03-07 PROCEDURE — 1125F AMNT PAIN NOTED PAIN PRSNT: CPT | Performed by: INTERNAL MEDICINE

## 2024-03-07 PROCEDURE — 1159F MED LIST DOCD IN RCRD: CPT | Performed by: INTERNAL MEDICINE

## 2024-03-07 PROCEDURE — 99214 OFFICE O/P EST MOD 30 MIN: CPT | Performed by: INTERNAL MEDICINE

## 2024-03-07 PROCEDURE — 1123F ACP DISCUSS/DSCN MKR DOCD: CPT | Performed by: INTERNAL MEDICINE

## 2024-03-07 PROCEDURE — 3074F SYST BP LT 130 MM HG: CPT | Performed by: INTERNAL MEDICINE

## 2024-03-07 PROCEDURE — 1160F RVW MEDS BY RX/DR IN RCRD: CPT | Performed by: INTERNAL MEDICINE

## 2024-03-07 ASSESSMENT — ENCOUNTER SYMPTOMS
SHORTNESS OF BREATH: 0
FATIGUE: 0
FEVER: 0
COUGH: 0

## 2024-03-07 ASSESSMENT — PATIENT HEALTH QUESTIONNAIRE - PHQ9
SUM OF ALL RESPONSES TO PHQ9 QUESTIONS 1 AND 2: 0
2. FEELING DOWN, DEPRESSED OR HOPELESS: NOT AT ALL
1. LITTLE INTEREST OR PLEASURE IN DOING THINGS: NOT AT ALL

## 2024-03-07 NOTE — PATIENT INSTRUCTIONS

## 2024-03-07 NOTE — PROGRESS NOTES
"Subjective   Bob Aceves is a 78 y.o. male who presents for Diabetes follow up.    HPI   Monitoring glucose TID.  \"All over the place.\"  Ave: 140-200.  Denies Hypoglycemia.  Limiting carbs/sugars in meals.    Voices some improvement in LE pain.  Continues to use cane/walker for transfers/ambulation.   Taking Gabapentin BID, per Pain Management.  Saw Neurosurgeon.  Not candidate for surgery at this point.  Recommended aquatherapy/accupuncture.  Going to PT and aquatherapy.  To be re-evaluated in two weeks for continuation of services vs pain management re-eval.    Review of Systems   Constitutional:  Negative for fatigue and fever.   Respiratory:  Negative for cough and shortness of breath.    Cardiovascular:  Negative for chest pain and leg swelling.   All other systems reviewed and are negative.      Health Maintenance Due   Topic Date Due    Diabetes: Foot Exam  Never done    Diabetes: Retinopathy Screening  Never done    Hepatitis C Screening  Never done    COVID-19 Vaccine (7 - 2023-24 season) 11/16/2023       Objective   /78   Pulse 80   Temp 36.7 °C (98.1 °F)   Resp 16   Ht 1.803 m (5' 11\")   Wt 78.9 kg (174 lb)   SpO2 100%   BMI 24.27 kg/m²     Physical Exam  Vitals and nursing note reviewed.   Constitutional:       Appearance: Normal appearance.   HENT:      Head: Normocephalic.   Eyes:      Conjunctiva/sclera: Conjunctivae normal.      Pupils: Pupils are equal, round, and reactive to light.   Cardiovascular:      Rate and Rhythm: Normal rate and regular rhythm.      Pulses: Normal pulses.      Heart sounds: Normal heart sounds.   Pulmonary:      Effort: Pulmonary effort is normal.      Breath sounds: Normal breath sounds.   Musculoskeletal:         General: No swelling.      Cervical back: Neck supple.   Skin:     General: Skin is warm and dry.   Neurological:      General: No focal deficit present.      Mental Status: He is oriented to person, place, and time.         Assessment/Plan "   Problem List Items Addressed This Visit       Ascending aortic aneurysm (CMS/[facility])    Diabetes mellitus (CMS/[facility])    Relevant Orders    Lipid Panel    CBC    Comprehensive Metabolic Panel    Hemoglobin A1C    Vitamin B12    Hypertension    Hyperlipidemia    S/P CABG (coronary artery bypass graft)    DM type 2 without retinopathy (CMS/[facility])    Pain in limb    Acute exacerbation of chronic obstructive pulmonary disease (CMS/[facility]) - Primary    Malignant neoplasm of prostate (CMS/[facility])     Other Visit Diagnoses       Spinal stenosis of lumbar region with neurogenic claudication              Increase to whole jardiance    Cont meds  Reviewed labs  Stable based on symptoms and exam.  Continue established treatment plan and follow up at least yearly.       Patient was identified as a fall risk. Risk prevention instructions provided.

## 2024-03-13 ENCOUNTER — TREATMENT (OUTPATIENT)
Dept: PHYSICAL THERAPY | Facility: CLINIC | Age: 79
End: 2024-03-13
Payer: MEDICARE

## 2024-03-13 ENCOUNTER — APPOINTMENT (OUTPATIENT)
Dept: PHYSICAL THERAPY | Facility: CLINIC | Age: 79
End: 2024-03-13
Payer: MEDICARE

## 2024-03-13 DIAGNOSIS — M25.69 BACK STIFFNESS: Primary | ICD-10-CM

## 2024-03-13 DIAGNOSIS — M79.662 PAIN OF LEFT LOWER LEG: ICD-10-CM

## 2024-03-13 DIAGNOSIS — M54.16 LUMBAR RADICULOPATHY: ICD-10-CM

## 2024-03-13 DIAGNOSIS — M54.9 BACK PAIN: ICD-10-CM

## 2024-03-13 PROCEDURE — 97113 AQUATIC THERAPY/EXERCISES: CPT | Mod: GP,CQ

## 2024-03-13 NOTE — PROGRESS NOTES
Patient Name: Bob Aceves  MRN: [id number]  Today's Date: 3/13/2024  Time Calculation  Start Time: 1330  Stop Time: 1410  Time Calculation (min): 40 min  INSURANCE :   10 of 20   Subjective:  Reports that he walked some yesterday with assistive device and today has increased L LE radicular pain . Radiates down Lateral L LE to distal knee .    Objective:  1 UE support occasionally needed during balance activities.     Assessment:   General visual and verbal cues needed to correct technique during activities. Implemented balance activities this visit with appropriate challenge and no increased sx verbalized.    Plan:   Continue per the plan of care to decrease pain and increase functional mobility.     Treatment :   AQUATIC THERAPEUTIC EXERCISE 47592 40 Minutes 3 units   Aquatic gait forward/Bwd/Lateral Wall No UE assist 3x each   March  1 minute   HS/GS Stretches Gastroc only today 2 x30   Hip 3 way 2 x10   Squats x20  DLS 3 way  Level1 trunk against wall   Trunk Rotation with noodle x10   Noodle Pull down x10   Rows Level 5 x10 trunk against wall   SKTC 2 x30  HSC 20 x  L nerve glide x10   SL stance   SL tap 8 inch 1 UE /No UE assist        Current Problem:  1. Back stiffness        2. Back pain        3. Pain of left lower leg        4. Lumbar radiculopathy  Follow Up In Physical Therapy           Pain:  4/10       Precautions: No recent falls.

## 2024-03-17 DIAGNOSIS — Z79.4 TYPE 2 DIABETES MELLITUS WITHOUT COMPLICATION, WITH LONG-TERM CURRENT USE OF INSULIN (MULTI): ICD-10-CM

## 2024-03-17 DIAGNOSIS — E11.9 TYPE 2 DIABETES MELLITUS WITHOUT COMPLICATION, WITH LONG-TERM CURRENT USE OF INSULIN (MULTI): ICD-10-CM

## 2024-03-18 RX ORDER — SITAGLIPTIN 100 MG/1
100 TABLET, FILM COATED ORAL DAILY
Qty: 100 TABLET | Refills: 2 | Status: SHIPPED | OUTPATIENT
Start: 2024-03-18 | End: 2024-04-15 | Stop reason: SDUPTHER

## 2024-03-20 ENCOUNTER — TREATMENT (OUTPATIENT)
Dept: PHYSICAL THERAPY | Facility: CLINIC | Age: 79
End: 2024-03-20
Payer: MEDICARE

## 2024-03-20 DIAGNOSIS — M54.16 LUMBAR RADICULOPATHY: ICD-10-CM

## 2024-03-20 DIAGNOSIS — M25.69 BACK STIFFNESS: ICD-10-CM

## 2024-03-20 DIAGNOSIS — M54.9 BACK PAIN: Primary | ICD-10-CM

## 2024-03-20 PROCEDURE — 97110 THERAPEUTIC EXERCISES: CPT | Mod: GP

## 2024-03-20 ASSESSMENT — PAIN SCALES - GENERAL: PAINLEVEL_OUTOF10: 6

## 2024-03-20 ASSESSMENT — PAIN - FUNCTIONAL ASSESSMENT: PAIN_FUNCTIONAL_ASSESSMENT: 0-10

## 2024-03-20 NOTE — PROGRESS NOTES
"Physical Therapy    Physical Therapy Treatment      Patient Name: Bob Aceves  MRN: [id number]  Today's Date: 3/20/2024  Time Calculation  Start Time: 1315  Stop Time: 1400  Time Calculation (min): 45 min    Assessment:  Pt is making progress with standing balance and tolerance. Pt will benefit from progression to more dynamic balance activities.     Plan:  Continue per plan of care as tolerated. Patient to continue with HEP each day independently.      Current Problem:   1. Back pain        2. Back stiffness        3. Lumbar radiculopathy  Follow Up In Physical Therapy          Subjective    Pt reports continued improvements with therapy, still up to 8/10 pain in the mornings.     Precautions:  Precautions  Precautions Comment: Fall around [place] 2023. Was using a cane. Leg gave out stepping off a curb/lip on the ground and fell, hiting face. Bruise to cheek and broke center of glasses. Did not seek medical care, no post concussive sx reported.    Pain:  Pain Assessment  Pain Assessment: 0-10  Pain Score: 6      Objective     L hip pain with attempted 90/90 half march     Treatments:  Access Code: [email or web address]    Exercises  Nustep L2 4 min NV  Supine Lower Trunk Rotation   Supine Sciatic Nerve Glide L assisted  Fibular nerve glides   Glute bridge  TA 3x10s  TA + march x 1 min  TA + leg ext x10 ea  TA + walkout 2x5  SB gentle distraction/ rotation  SB DKTC  SB lumbar stretch seated  HS stretch seated  TA + lean  STS 2x10  Standing cone tap  4\" step up  Step calf stretch  DF/PF standing, DF seated Black TB  - Feet Together Balance at Counter Top Eyes Closed  - 1 x daily - 7 x weekly - 3 sets - 10 reps  - Heel Toe Raises with Counter Support  - 1 x daily - 7 x weekly - 3 sets - 10 reps  - Side Stepping with Counter Support  - 1 x daily - 7 x weekly - 3 sets - 10 reps  Standing calf stretch  - Standing March with Counter Support  - 1 x daily - 7 x weekly - 3 sets - 10 reps    Manual:  Not this visit:  STM posterior knee, " lateral lower leg

## 2024-03-27 ENCOUNTER — APPOINTMENT (OUTPATIENT)
Dept: PHYSICAL THERAPY | Facility: CLINIC | Age: 79
End: 2024-03-27
Payer: MEDICARE

## 2024-04-01 ENCOUNTER — TREATMENT (OUTPATIENT)
Dept: PHYSICAL THERAPY | Facility: CLINIC | Age: 79
End: 2024-04-01
Payer: MEDICARE

## 2024-04-01 DIAGNOSIS — M25.69 BACK STIFFNESS: ICD-10-CM

## 2024-04-01 DIAGNOSIS — M54.9 BACK PAIN: Primary | ICD-10-CM

## 2024-04-01 DIAGNOSIS — M54.16 LUMBAR RADICULOPATHY: ICD-10-CM

## 2024-04-01 PROCEDURE — 97110 THERAPEUTIC EXERCISES: CPT | Mod: GP

## 2024-04-01 ASSESSMENT — PAIN SCALES - GENERAL: PAINLEVEL_OUTOF10: 6

## 2024-04-01 ASSESSMENT — PAIN - FUNCTIONAL ASSESSMENT: PAIN_FUNCTIONAL_ASSESSMENT: 0-10

## 2024-04-01 NOTE — PROGRESS NOTES
"Physical Therapy    Physical Therapy Treatment      Patient Name: Bob Aceves  MRN: [id number]  Today's Date: 4/1/2024  Time Calculation  Start Time: 1230  Stop Time: 1315  Time Calculation (min): 45 min    Assessment:  Pt was able to tolerate dynamic balance activities, with 2 LOB with repositioning after semi tandem balance. Pt is still making progress with his balance but leg pain does not seem to be improving.     Plan:  Continue per plan of care as tolerated. Patient to continue with HEP each day independently.      Current Problem:   1. Back pain        2. Lumbar radiculopathy  Follow Up In Physical Therapy      3. Back stiffness            Subjective    Pt reports biggest difficulty is his first few steps of the day, pt reminded to try exercises first to see if this makes a difference.    Precautions:  Precautions  Precautions Comment: Fall around [place] 2023. Was using a cane. Leg gave out stepping off a curb/lip on the ground and fell, hiting face. Bruise to cheek and broke center of glasses. Did not seek medical care, no post concussive sx reported.    Pain:  Pain Assessment  Pain Assessment: 0-10  Pain Score: 6      Objective     L hip pain with attempted TA + leg ext  Gait: R outtoeing    Treatments:  Access Code: [email or web address]    Exercises  Nustep L2 4 min   Supine Lower Trunk Rotation   Supine Sciatic Nerve Glide L assisted  Fibular nerve glides   Glute bridge  TA 3x10s  TA + march x 1 min  TA + leg ext x10 ea  TA + walkout 2x5  SB gentle distraction/ rotation  SB DKTC  SB lumbar stretch seated  HS stretch seated  TA + lean  STS 2x10  Standing cone tap NV  4\" step up  Step calf stretch  DF/PF standing, DF seated Black TB  - Feet Together Balance at Counter Top Eyes Closed  - 1 x daily - 7 x weekly - 3 sets - 10 reps  - Heel Toe Raises with Counter Support  - 1 x daily - 7 x weekly - 3 sets - 10 reps  - Side Stepping along table  - Standing March with Counter Support  - 1 x daily - 7 x weekly - 3 sets - 10 " reps    Manual:  Not this visit:  STM posterior knee, lateral lower leg

## 2024-04-10 ENCOUNTER — TREATMENT (OUTPATIENT)
Dept: PHYSICAL THERAPY | Facility: CLINIC | Age: 79
End: 2024-04-10
Payer: MEDICARE

## 2024-04-10 DIAGNOSIS — M54.16 LUMBAR RADICULOPATHY: Primary | ICD-10-CM

## 2024-04-10 DIAGNOSIS — M25.69 BACK STIFFNESS: ICD-10-CM

## 2024-04-10 DIAGNOSIS — M54.9 BACK PAIN: ICD-10-CM

## 2024-04-10 PROCEDURE — 97113 AQUATIC THERAPY/EXERCISES: CPT | Mod: GP,CQ

## 2024-04-10 NOTE — PROGRESS NOTES
Patient Name: Bob Aceves  MRN: [id number]  Today's Date: 4/10/2024  Time Calculation  Start Time: 1320  Stop Time: 1352  Time Calculation (min): 32 min  INSURANCE :   13 of 20   Subjective:  Pt reports still has L LE radicular pain that increases after sitting for long periods of time. States he has been completing his stretches at home when he feels stiff which improves his sx.    Objective:  Pt ambulates with a SPC on pool deck with antalgic gait.     Assessment:   Pt tolerated treatment session well with no increase of pain sx verbalized. VC needed to promote proper trunk positioning when completing activities; initial correction but further cues required. Trunk support still needed for core activities this date, however increased spinal positioning when completing DLS.     Plan:   Progress strengthening, stability, and mobility activities as tolerated to increase functional mobility.     Treatment :   AQUATIC THERAPEUTIC EXERCISE 26083 32 mins/ 2 units  Aquatic gait forward/Bwd/Lateral Wall No UE assist 3x each   March  1 minute   HS/GS Stretches Gastroc only today 2 x30 NV  Hip 3 way x15 each  Hip circles CW/CWW x15  Squats x20  HR/TR x20  DLS 3 way  Level1 trunk against wall   Trunk Rotation with noodle x10  NV  Noodle Pull down x10 NV  Rows Level 5 x10 trunk against wall   SKTC 2 x30 NV  HSC x1 minute  L nerve glide x10 NV  SL stance 2x30   SL tap 8 inch 1 UE /No UE assist NV              Current Problem:  1. Lumbar radiculopathy  Follow Up In Physical Therapy      2. Back pain        3. Back stiffness                Pain:  3/10   Location: LB/L LE     Treatment performed by Jonna VILLASENOR, under the supervision of Tanvi Witt PTA.

## 2024-04-15 ENCOUNTER — TELEPHONE (OUTPATIENT)
Dept: PRIMARY CARE | Facility: CLINIC | Age: 79
End: 2024-04-15
Payer: MEDICARE

## 2024-04-15 DIAGNOSIS — Z79.4 TYPE 2 DIABETES MELLITUS WITHOUT COMPLICATION, WITH LONG-TERM CURRENT USE OF INSULIN (MULTI): ICD-10-CM

## 2024-04-15 DIAGNOSIS — E11.9 TYPE 2 DIABETES MELLITUS WITHOUT COMPLICATION, WITH LONG-TERM CURRENT USE OF INSULIN (MULTI): ICD-10-CM

## 2024-04-15 NOTE — TELEPHONE ENCOUNTER
Pt would like to request a 30 day refill due to upfront cost of 90 day supply.    Rx Refill Request Telephone Encounter    Name:  Bob Aceves  :  [id number]  Medication Name:  Januvia 100 mg tablet   Specific Pharmacy location:  [facility]   Date of last appointment:  24  Date of next appointment:  24  Best number to reach patient:  [phone]

## 2024-04-16 ENCOUNTER — OFFICE VISIT (OUTPATIENT)
Dept: PRIMARY CARE | Facility: CLINIC | Age: 79
End: 2024-04-16
Payer: MEDICARE

## 2024-04-16 VITALS
HEART RATE: 64 BPM | OXYGEN SATURATION: 98 % | RESPIRATION RATE: 16 BRPM | WEIGHT: 176 LBS | SYSTOLIC BLOOD PRESSURE: 120 MMHG | HEIGHT: 71 IN | DIASTOLIC BLOOD PRESSURE: 76 MMHG | BODY MASS INDEX: 24.64 KG/M2 | TEMPERATURE: 97.9 F

## 2024-04-16 DIAGNOSIS — R21 RASH: Primary | ICD-10-CM

## 2024-04-16 DIAGNOSIS — R05.1 ACUTE COUGH: ICD-10-CM

## 2024-04-16 DIAGNOSIS — J20.9 ACUTE BRONCHITIS, UNSPECIFIED ORGANISM: ICD-10-CM

## 2024-04-16 PROBLEM — M54.9 BACK PAIN: Status: RESOLVED | Noted: 2024-01-10 | Resolved: 2024-04-16

## 2024-04-16 PROBLEM — M25.69 BACK STIFFNESS: Status: RESOLVED | Noted: 2024-01-10 | Resolved: 2024-04-16

## 2024-04-16 PROBLEM — M79.609 PAIN IN LIMB: Status: RESOLVED | Noted: 2023-09-05 | Resolved: 2024-04-16

## 2024-04-16 PROBLEM — L57.0 ACTINIC KERATOSES: Status: RESOLVED | Noted: 2023-09-05 | Resolved: 2024-04-16

## 2024-04-16 PROCEDURE — 3074F SYST BP LT 130 MM HG: CPT | Performed by: INTERNAL MEDICINE

## 2024-04-16 PROCEDURE — 1160F RVW MEDS BY RX/DR IN RCRD: CPT | Performed by: INTERNAL MEDICINE

## 2024-04-16 PROCEDURE — 3078F DIAST BP <80 MM HG: CPT | Performed by: INTERNAL MEDICINE

## 2024-04-16 PROCEDURE — 99214 OFFICE O/P EST MOD 30 MIN: CPT | Performed by: INTERNAL MEDICINE

## 2024-04-16 PROCEDURE — 1036F TOBACCO NON-USER: CPT | Performed by: INTERNAL MEDICINE

## 2024-04-16 PROCEDURE — 1159F MED LIST DOCD IN RCRD: CPT | Performed by: INTERNAL MEDICINE

## 2024-04-16 PROCEDURE — 1123F ACP DISCUSS/DSCN MKR DOCD: CPT | Performed by: INTERNAL MEDICINE

## 2024-04-16 RX ORDER — CLOTRIMAZOLE AND BETAMETHASONE DIPROPIONATE 10; .64 MG/G; MG/G
1 CREAM TOPICAL 2 TIMES DAILY
Qty: 30 G | Refills: 1 | Status: SHIPPED | OUTPATIENT
Start: 2024-04-16 | End: 2024-06-15

## 2024-04-16 RX ORDER — DOXYCYCLINE 100 MG/1
100 CAPSULE ORAL 2 TIMES DAILY
Qty: 14 CAPSULE | Refills: 0 | Status: SHIPPED | OUTPATIENT
Start: 2024-04-16 | End: 2024-04-23

## 2024-04-16 ASSESSMENT — ENCOUNTER SYMPTOMS
FEVER: 0
SHORTNESS OF BREATH: 0
FATIGUE: 0
COUGH: 0

## 2024-04-16 NOTE — PATIENT INSTRUCTIONS

## 2024-04-16 NOTE — PROGRESS NOTES
"Subjective   Bob Aceves is a 79 y.o. male who presents for Rash and Cough.    HPI   Pt c/o rash to back of L hand x1 week.  Dark pink in color, raised.  Mildly itchy.  Denies pain.  OTC tx ineffective.    Reports moist, non-productive cough x2 weeks.  Denies chest pain/heaviness, SOB.  Tx: OTC allergy pill. Albuterol inhaler BID-TID.  Effective.    Review of Systems   Constitutional:  Negative for fatigue and fever.   Respiratory:  Negative for cough and shortness of breath.    Cardiovascular:  Negative for chest pain and leg swelling.   All other systems reviewed and are negative.      Health Maintenance Due   Topic Date Due    Diabetes: Foot Exam  Never done    Diabetes: Retinopathy Screening  Never done    Hepatitis C Screening  Never done    COVID-19 Vaccine (7 - 2023-24 season) 11/16/2023    Diabetes: Hemoglobin A1C  05/28/2024       Objective   /76   Pulse 64   Temp 36.6 °C (97.9 °F)   Resp 16   Ht 1.803 m (5' 11\")   Wt 79.8 kg (176 lb)   SpO2 98%   BMI 24.55 kg/m²     Physical Exam  Vitals and nursing note reviewed.   Constitutional:       Appearance: Normal appearance.   HENT:      Head: Normocephalic.   Eyes:      Conjunctiva/sclera: Conjunctivae normal.      Pupils: Pupils are equal, round, and reactive to light.   Cardiovascular:      Rate and Rhythm: Normal rate and regular rhythm.      Pulses: Normal pulses.      Heart sounds: Normal heart sounds.   Pulmonary:      Effort: Pulmonary effort is normal.      Breath sounds: Normal breath sounds.   Musculoskeletal:         General: No swelling.      Cervical back: Neck supple.   Skin:     General: Skin is warm and dry.   Neurological:      General: No focal deficit present.      Mental Status: He is oriented to person, place, and time.         Assessment/Plan   Problem List Items Addressed This Visit    None  Visit Diagnoses       Rash    -  Primary    Relevant Medications    clotrimazole-betamethasone (Lotrisone) cream    Acute cough        " Acute bronchitis, unspecified organism        Relevant Medications    doxycycline (Vibramycin) 100 mg capsule        Cont meds  Abx   Cream for hand  Call if not improving  Stable based on symptoms and exam.  Continue established treatment plan and follow up at least yearly.         Patient was identified as a fall risk. Risk prevention instructions provided.

## 2024-04-17 ENCOUNTER — TREATMENT (OUTPATIENT)
Dept: PHYSICAL THERAPY | Facility: CLINIC | Age: 79
End: 2024-04-17
Payer: MEDICARE

## 2024-04-17 DIAGNOSIS — M54.16 LUMBAR RADICULOPATHY: ICD-10-CM

## 2024-04-17 DIAGNOSIS — M25.69 BACK STIFFNESS: Primary | ICD-10-CM

## 2024-04-17 DIAGNOSIS — M54.9 BACK PAIN: ICD-10-CM

## 2024-04-17 PROCEDURE — 97110 THERAPEUTIC EXERCISES: CPT | Mod: GP

## 2024-04-17 ASSESSMENT — PAIN - FUNCTIONAL ASSESSMENT: PAIN_FUNCTIONAL_ASSESSMENT: 0-10

## 2024-04-17 ASSESSMENT — PAIN SCALES - GENERAL: PAINLEVEL_OUTOF10: 4

## 2024-04-17 NOTE — PROGRESS NOTES
"Physical Therapy    Physical Therapy Treatment      Patient Name: Bob Aceves  MRN: [id number]  Today's Date: 4/17/2024 (re-assessed 3/6)  Time Calculation  Start Time: 1315  Stop Time: 1400  Time Calculation (min): 45 min    Assessment:  Pt reduced to 1 LOB this date with standing balance, which he immediately self recovered, and seems to be improving each attempt. Pt bill benefit from continued activity at home to maintain his progress.     Plan:  Continue per plan of care as tolerated. Patient to continue with HEP each day independently.      Current Problem:   1. Back stiffness        2. Back pain              Subjective    Pt reports doing lumbar rotation before getting out of bed which helps with first stiff steps of the day. Pt reports feeling fatigued more than usual.     Precautions:  Precautions  Precautions Comment: Fall around [place] 2023. Was using a cane. Leg gave out stepping off a curb/lip on the ground and fell, hiting face. Bruise to cheek and broke center of glasses. Did not seek medical care, no post concussive sx reported.    Pain:  Pain Assessment  Pain Assessment: 0-10  Pain Score: 6      Objective     Gait: crosses midline/off his path from time to time    Treatments:  Access Code: [email or web address]    Exercises  Nustep L2 4 min   Supine Lower Trunk Rotation   Supine Sciatic Nerve Glide L assisted  Fibular nerve glides   Glute bridge  Blue TB SL clam 2x10 ea   TA 3x10s  TA + march x 1 min  TA + leg ext x10 ea  TA + walkout 2x5  SB gentle distraction/ rotation  SB DKTC  SB HS curl  SB lumbar stretch seated  HS stretch seated  TA + lean  STS 2x10  Standing cone tap NV  4\" step up  Step calf stretch  DF/PF standing, DF seated Black TB  - Feet Together Balance at Counter Top Eyes Closed  - 1 x daily - 7 x weekly - 3 sets - 10 reps  - Heel Toe Raises with Counter Support  - 1 x daily - 7 x weekly - 3 sets - 10 reps  - Side Stepping along table  - Standing March with Counter Support  - 1 x daily - 7 x " weekly - 3 sets - 10 reps    Manual:  Not this visit:  STM posterior knee, lateral lower leg

## 2024-04-23 DIAGNOSIS — E78.49 OTHER HYPERLIPIDEMIA: Primary | ICD-10-CM

## 2024-04-23 DIAGNOSIS — E11.9 TYPE 2 DIABETES MELLITUS WITHOUT COMPLICATION, WITH LONG-TERM CURRENT USE OF INSULIN (MULTI): ICD-10-CM

## 2024-04-23 DIAGNOSIS — Z79.4 TYPE 2 DIABETES MELLITUS WITHOUT COMPLICATION, WITH LONG-TERM CURRENT USE OF INSULIN (MULTI): ICD-10-CM

## 2024-04-23 RX ORDER — METFORMIN HYDROCHLORIDE 1000 MG/1
TABLET ORAL
Qty: 180 TABLET | Refills: 3 | Status: SHIPPED | OUTPATIENT
Start: 2024-04-23

## 2024-04-24 ENCOUNTER — TREATMENT (OUTPATIENT)
Dept: PHYSICAL THERAPY | Facility: CLINIC | Age: 79
End: 2024-04-24
Payer: MEDICARE

## 2024-04-24 DIAGNOSIS — M25.69 BACK STIFFNESS: Primary | ICD-10-CM

## 2024-04-24 DIAGNOSIS — M54.9 BACK PAIN: ICD-10-CM

## 2024-04-24 DIAGNOSIS — M54.16 LUMBAR RADICULOPATHY: ICD-10-CM

## 2024-04-24 PROCEDURE — 97113 AQUATIC THERAPY/EXERCISES: CPT | Mod: GP,CQ

## 2024-04-24 RX ORDER — ATORVASTATIN CALCIUM 10 MG/1
10 TABLET, FILM COATED ORAL DAILY
Qty: 100 TABLET | Refills: 2 | Status: SHIPPED | OUTPATIENT
Start: 2024-04-24

## 2024-04-24 NOTE — PROGRESS NOTES
Patient Name: Bob Aceves  MRN: [id number]  Today's Date: 4/24/2024  Time Calculation  Start Time: 1330  Stop Time: 1401  Time Calculation (min): 31 min  INSURANCE :   15 of 20   Subjective:  Pt reports his back is feeling better, however his L hip has been bothering him recently.     Objective:  Ambulates with SPC on pool deck.     Assessment:   Less cues needed this date for proper technique. Pt still requires wall assist during aquatic gait activities this date. Pt still challenged to complete DLS activities without trunk support. Implemented balance activities this date with appropriate challenge.     Plan:   Continue per the PT's plan of care.    Treatment :   AQUATIC THERAPEUTIC EXERCISE 88458 31 mins/ 2 units  Aquatic gait forward/Bwd/Lateral Wall No UE assist 3x each   March  1 minute   HS/GS Stretches Gastroc only today 2 x30 NV  Hip 3 way x15 each  Hip circles CW/CWW x15  Squats x20  HR/TR x20  DLS 3 way  Level1 trunk against wall   Trunk Rotation with noodle x10    Noodle Pull down x10  Rows Level 5 x10 trunk against wall   SKTC 2 x30 NV  HSC x1 minute  L nerve glide x10 NV  Tandem stance 2x30               Current Problem:  1. Back stiffness        2. Back pain              Treatment performed by Jonna VILLASENOR, under the supervision of Tanvi Witt PTA.

## 2024-05-01 ENCOUNTER — TREATMENT (OUTPATIENT)
Dept: PHYSICAL THERAPY | Facility: CLINIC | Age: 79
End: 2024-05-01
Payer: MEDICARE

## 2024-05-01 DIAGNOSIS — M54.16 LUMBAR RADICULOPATHY: ICD-10-CM

## 2024-05-01 PROCEDURE — 97113 AQUATIC THERAPY/EXERCISES: CPT | Mod: GP,CQ

## 2024-05-01 NOTE — PROGRESS NOTES
Patient Name: Bob Aceves  MRN: [id number]  Today's Date: 5/1/2024  Time Calculation  Start Time: 1332  Stop Time: 1400  Time Calculation (min): 28 min  INSURANCE :   16 of 20    Subjective:  Pt reports he is having no pain prior to treatment session. States he occasionally gets discomfort in his L hip after sitting for long periods of time.     Objective:  1 UE support entering/ exiting pool ramp.   [email or web address] emailed aquatic program on 5/10/2024 via  [place]  Access Code [email or web address]    Assessment:   Less cues needed this date for proper technique. Pt continues to require 1 UE support during aquatic gait activties this date. Pt verbalized he recently became a member at the fitness center and will continue to complete aquatic exercises independently since this is his last pool session.     Plan:   Issue aquatic HEP. Reeval NV . PT has joined [employer] [facility] and will continue aquatic independently with use of handout.    Treatment :   AQUATIC THERAPEUTIC EXERCISE 61995 28 mins/2 units   Aquatic gait forward/Bwd/Lateral Wall No UE assist 3x each   March  1 minute   HS/GS Stretches Gastroc only today 2 x30 NV  Hip 3 way x15 each  Hip circles CW/CWW x15  Squats x20  HR/TR x20  DLS 3 way  Level1 trunk against wall x15  Noodle Pull down x10   Rows Level 5 x15 trunk against wall   SKTC 2 x30 NV  HSC x1 minute  Tandem stance 2x30 NV            Current Problem:  1. Lumbar radiculopathy  Follow Up In Physical Therapy           Precautions:   No recent falls     Treatment performed by Jonna VILLASENOR, under the supervision of Tanvi Witt PTA.

## 2024-05-08 ENCOUNTER — TREATMENT (OUTPATIENT)
Dept: PHYSICAL THERAPY | Facility: CLINIC | Age: 79
End: 2024-05-08
Payer: MEDICARE

## 2024-05-08 DIAGNOSIS — M54.9 BACK PAIN: Primary | ICD-10-CM

## 2024-05-08 DIAGNOSIS — M54.16 LUMBAR RADICULOPATHY: ICD-10-CM

## 2024-05-08 DIAGNOSIS — M25.69 BACK STIFFNESS: ICD-10-CM

## 2024-05-08 PROCEDURE — 97110 THERAPEUTIC EXERCISES: CPT | Mod: GP

## 2024-05-08 ASSESSMENT — PAIN - FUNCTIONAL ASSESSMENT: PAIN_FUNCTIONAL_ASSESSMENT: 0-10

## 2024-05-08 ASSESSMENT — PAIN SCALES - GENERAL: PAINLEVEL_OUTOF10: 8

## 2024-05-08 NOTE — PROGRESS NOTES
Physical Therapy    Physical Therapy Treatment      Patient Name: Bob Aceves  MRN: [id number]  Today's Date: 5/8/2024       Assessment:  Pt tested positive for several lumbar and hip special tests, injury irritability too high to differentiate, although many of his pain provocation moments were with hip movements rather than spine movements. Pt encouraged to see orthopedic specialist to help him return to PLOF.     Plan:   Pt will be seen 1-2x/week for 10 visits. Potential to achieve rehab goals is good. Plan of care was developed with input and agreement by the patient.    Treatment may include: Therapeutic Exercise (PROM, AA/AROM, Flexibility, Strengthening, Stabilization, HEP instruction). Therapeutic Activities (Transfers, Body Mechanics, Work Related Activities, Closed Chain, Agility and Power). Manual Techniques (Soft tissue Mobilization, Joint Mobilization/Distraction, Muscle Energy Techniques, Lymphatic Drainage, Dry Needling). Neuromuscular Reeducation (Postural Training, Balance/Proprioception, Relaxation Techniques). Biofeedback. Aquatic Exercise. Modalities: Ultrasound, Cryotherapy, Vasopneumatic with or without Cryotherapy. Electrical Stimulation (TENS/IFC/ Premodulated for pain relief, NMES for Muscle reeducation). Gait Training. Orthotic Fit and Training. Strapping, Kinesiotaping.         Current Problem:   1. Lumbar radiculopathy  Follow Up In Physical Therapy            Subjective    Pt reports bending down for a hose and  felt a pop, pain in LLE and shooting 10/10 pain down to posterior lower calf. Pt reports the HEP will make her sore. Rested the rest of the afternoon.   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     Precautions:  Precautions  Precautions Comment: Fall around [place] 2023. Was using a cane. Leg gave out stepping off a curb/lip on the ground and fell, hiting face. Bruise to cheek and broke center of glasses. Did not  "seek medical care, no post concussive sx reported.    Pain:  Pain Assessment  Pain Assessment: 0-10  Pain Score: 8      Objective     Lumbar ROM (degrees):  Flexion: 75% NOT TESTED  Extension: 25%  Sidebend R/L: 25% devan p!  Rotation R/L: 75% p! R  HIP AROM (degrees):  Hip Internal Rotation R/L: moderate restriction R, could not test L  Hip External Rotation R/L:  no restriction R, could not test L      Flexibility (degrees):   Hamstring 90/90 position R/L:30 / unable to test L     Strength Testing:  Hip Abduction R/L: 4/3+ NT  Hip Extension R/L:  3/2 NT  Hip Flexion R/L: 4-/4-  Knee extension R/L:  5/5  Knee Flexion R/L: 4+/4+  DF R/L: 4+/4  Great toe extension R/L: 5/4     Posture:  Sitting:  PPT     Observation:  Spasm/Tenderness: none  Gait:hip flexion, use of FWW    Log Roll (+)  Distraction short axis- slight lateral leg pain   Well leg raise (-)  SLR (+) at 10 deg  Slump test (+)    Treatments:  Access Code: [email or web address]    Exercises  Nustep L2 4 min NV  Supine Lower Trunk Rotation   Supine Sciatic Nerve Glide L assisted  Fibular nerve glides  Glute bridge  TA 3x10s  TA + march x 1 min  TA + leg ext x10 ea  TA + walkout 2x5  Re-assessment    Not this visit:  SB gentle distraction/ rotation  SB DKTC  SB lumbar stretch seated  HS stretch seated  STS 2x10  Standing cone tap  4\" step up  Step calf stretch  DF/PF standing, DF seated Black TB  - Feet Together Balance at Counter Top Eyes Closed  - 1 x daily - 7 x weekly - 3 sets - 10 reps  - Heel Toe Raises with Counter Support  - 1 x daily - 7 x weekly - 3 sets - 10 reps  - Side Stepping with Counter Support  - 1 x daily - 7 x weekly - 3 sets - 10 reps  - Standing March with Counter Support  - 1 x daily - 7 x weekly - 3 sets - 10 reps    Manual:  Not this visit:  STM posterior knee, lateral lower leg    Goals:  Lumbar goals  Patient will verbalize pain (0-10) less than or equal to  0/10 at rest, and  2 /10 with activity. NOT MET     Modified Oswestry Disability " Questionnaire (MODQ) rating score less than or equal to 10% to demonstrate overall improved functional abilities. NOT MET     Lumbar AROM will be greater than or equal to: Flexion , Extension and Left and Right Side Bending 100%, to improve joint mechanics during ADL's. NOT MET     Patient will correctly perform home exercise program to progress current functional status. MET     Strength will improve to greater than or equal to 4/5 for hip abduction/extension with improved recruitment to improve joint stability during ADL's NOT MET     Hamstring flexibility will improve to 20 ° or better in 90°/90° position. NOT MET     Rectus flexibility in prone will be greater than or equal to 120° without spine motion to decrease strain on the spine in standing and walking. NT     Patient will have improved lumbar and pelvic alignment consistently to decrease pain in the lumbosacral complex. PARTIALLY MET    Pt will score</= 19 on Tinetti to reduce to low fall risk.  NT

## 2024-05-10 ENCOUNTER — OFFICE VISIT (OUTPATIENT)
Dept: PAIN MEDICINE | Facility: CLINIC | Age: 79
End: 2024-05-10
Payer: MEDICARE

## 2024-05-10 VITALS
TEMPERATURE: 96 F | DIASTOLIC BLOOD PRESSURE: 73 MMHG | HEART RATE: 84 BPM | OXYGEN SATURATION: 94 % | SYSTOLIC BLOOD PRESSURE: 158 MMHG

## 2024-05-10 DIAGNOSIS — M54.16 LUMBAR RADICULOPATHY: ICD-10-CM

## 2024-05-10 DIAGNOSIS — G89.29 CHRONIC BILATERAL LOW BACK PAIN WITH LEFT-SIDED SCIATICA: Primary | ICD-10-CM

## 2024-05-10 DIAGNOSIS — M54.42 CHRONIC BILATERAL LOW BACK PAIN WITH LEFT-SIDED SCIATICA: Primary | ICD-10-CM

## 2024-05-10 PROCEDURE — 1160F RVW MEDS BY RX/DR IN RCRD: CPT

## 2024-05-10 PROCEDURE — 3078F DIAST BP <80 MM HG: CPT

## 2024-05-10 PROCEDURE — 99213 OFFICE O/P EST LOW 20 MIN: CPT

## 2024-05-10 PROCEDURE — 1123F ACP DISCUSS/DSCN MKR DOCD: CPT

## 2024-05-10 PROCEDURE — 1125F AMNT PAIN NOTED PAIN PRSNT: CPT

## 2024-05-10 PROCEDURE — 3077F SYST BP >= 140 MM HG: CPT

## 2024-05-10 PROCEDURE — 1159F MED LIST DOCD IN RCRD: CPT

## 2024-05-10 RX ORDER — GABAPENTIN 100 MG/1
100 CAPSULE ORAL 4 TIMES DAILY
Qty: 120 CAPSULE | Refills: 2 | Status: SHIPPED | OUTPATIENT
Start: 2024-05-10 | End: 2024-08-08

## 2024-05-10 SDOH — SOCIAL STABILITY: SOCIAL NETWORK: SOCIAL ACTIVITY:: 5

## 2024-05-10 ASSESSMENT — PAIN SCALES - GENERAL: PAINLEVEL_OUTOF10: 9

## 2024-05-10 ASSESSMENT — ENCOUNTER SYMPTOMS
LOSS OF SENSATION IN FEET: 0
OCCASIONAL FEELINGS OF UNSTEADINESS: 1

## 2024-05-10 ASSESSMENT — COLUMBIA-SUICIDE SEVERITY RATING SCALE - C-SSRS
2. HAVE YOU ACTUALLY HAD ANY THOUGHTS OF KILLING YOURSELF?: NO
1. IN THE PAST MONTH, HAVE YOU WISHED YOU WERE DEAD OR WISHED YOU COULD GO TO SLEEP AND NOT WAKE UP?: NO
6. HAVE YOU EVER DONE ANYTHING, STARTED TO DO ANYTHING, OR PREPARED TO DO ANYTHING TO END YOUR LIFE?: NO

## 2024-05-10 ASSESSMENT — PATIENT HEALTH QUESTIONNAIRE - PHQ9
2. FEELING DOWN, DEPRESSED OR HOPELESS: NOT AT ALL
1. LITTLE INTEREST OR PLEASURE IN DOING THINGS: NOT AT ALL
SUM OF ALL RESPONSES TO PHQ9 QUESTIONS 1 AND 2: 0

## 2024-05-10 ASSESSMENT — PAIN - FUNCTIONAL ASSESSMENT: PAIN_FUNCTIONAL_ASSESSMENT: 0-10

## 2024-05-10 NOTE — PROGRESS NOTES
Subjective   Patient ID: Bob Aceves is a 79 y.o. male who presents for Follow-up and Pain.  HPI    78 yo male presents today for follow up on low back pain. Patient states that on Tuesday he bent over to  something and he is now experiencing pain in the low back with radiation to the left leg. Rates the pain as 8-9/10. Characterized as sharp, stabbing. Worse with movement. Alleviating factors include movement and laying on his right side. Aggravating factors include rising from the bed or chair. Denies any numbness or tingling. Denies bowel or bladder incontinence. He was started on 100mg of gabapentin three times daily at his last visit and he does not report any side effects. He states that it is effective at reducing his pain, however, he is still experiencing symptoms. He is taking Naproxen twice daily. He recently completed physical therapy, he would like to try aquatic therapy.     Review of Systems  All 13 systems were reviewed and are within normal levels except as noted below or per HPI. Positive and pertinent negative responses are noted below or in the HPI   Denied any fever or chills. No weight loss and no night sweats. No cough or sputum production. No diarrhea   Denies constipation.  No bladder and bowel incontinence and no other changes in bladder and bowel. No skin changes. Reports tiredness and fatigability only if the pain is not controlled.   Denied opioids diversion and abuse and denies alcoholism. Denies overuse of the pain medications.      Objective   Physical Exam    General   Alert and oriented x4, pleasant and cooperative.      HEENT  Pupils are equal and normal in size. Ears, nose, mouth, and throat appear to be WNL.  Head atraumatic, symmetric.      No signs of sedation or signs of withdrawal apparent.     Psychiatric   No signs of depression apparent. Appropriate mood and affect.     Neuro   No focal neurological deficit apparent. Ambulation at baseline using walker. Normal  visual inspection of the lumbar spine. Lumbar paraspinals nontender to palpation. Left SI joint tender to palpation. + RIGHT DIMAS. Normal strength and sensation in the bilateral lower extremities.      Respiratory  No respiratory distress, respirations equal and unlabored.     Abdomen  Soft and nontender, no distention noted.     Skin  Warm, dry and intact. No skin markings supportive of recent IV drug usage .        Narrative & Impression   Interpreted By:  Isidor Brown,  and Stephany Carr   STUDY:  MR LUMBAR SPINE WO IV CONTRAST      INDICATION:  Signs/Symptoms:left leg weakness      COMPARISON:  Lumbar spine radiograph 12/06/2023.      ACCESSION NUMBER(S):  [id number]      ORDERING CLINICIAN:  RUSSEL CRAWFORD      TECHNIQUE:  Multiplanar multisequence MRI of the lumbar spine was performed  without the administration of intravenous contrast, according to  standard protocol.      FINDINGS:  ALIGNMENT: Redemonstration of S shaped dextroscoliosis of the upper  lumbar spine, 20ï¿½ convex to the right centered at T12-L1 and to the  left at L4. Mild retrolisthesis of L2-3 and L3-4. Otherwise, the  alignment is within normal limits.      VERTEBRAE: Schmorl's nodes involving multiple vertebral levels for  example, T10, T12, L1, and L3. Multiple round Modic type 2 lesions  involving T11, L1, L3 and L4. Reactive endplate changes involving  L1-2 and L3-4. Mild vertebral height loss most pronounced at the  level of T12. Otherwise, vertebral bodies are normal in height  without fracture or aggressive osseous lesion.      DISCS: There is multilevel intervertebral disc height loss most  pronounced at the levels of L1-2, L2-3, and L3-4. Otherwise, the disc  spaces are largely maintained.      CONUS MEDULLARIS AND CAUDA EQUINA: The conus medullaris terminates at  L1-2. There is normal appearance of the conus medullaris and cauda  equina.      PARAVERTEBRAL SOFT TISSUES AND VISUALIZED RETROPERITONEUM: The  visualized paravertebral  soft tissues appear within normal limits.      EVALUATION OF INDIVIDUAL LEVELS:      T9-10: No stenosis is noted on the sagittal images.      T10-11: The thecal sac is mildly indented by disc bulge more so on  the left on the sagittal images.      T11-12: The thecal sac is mildly indented by disc bulge on the  sagittal images.          T12-L1: Disc bulge causes mild left lateral recess and neural  foraminal narrowing. The thecal sac is indented by small focal  central protrusion.      L1-2: Disc bulge and ligamentum flavum hypertrophy combined to causes  moderate to severe left and to lesser degree right lateral recess  narrowing as well as moderate central canal stenosis.      L2-3: The lateral recesses and spinal canal are severely stenosed by  disc protrusion, ligament thickening and facet hypertrophy. The left  neural foramen is moderately to severely stenosed by endplate  spurring and facet hypertrophy.      L3-4: The right neural foramen is moderately to severely stenosed by  facet hypertrophy endplate spurring and disc bulge with mild left  neural foraminal stenosis. The lateral recesses are mildly to  moderately stenosed more so on the right by disc bulge, ligament  thickening and facet hypertrophy.      L4-5: The left and to a lesser degree right lateral recesses are  moderately to severely stenosed secondary to the scoliosis, facet  hypertrophy and disc bulge. The neural foramina are severely stenosed  bilaterally. The facet joints are severely arthritic worse on the  right.      L5-S1: The neural foramina are severely stenosed by endplate spurring  and facet hypertrophy bilaterally more so on the left. The facet  joints are severely arthritic more so on the right. The lateral  recesses are mildly stenosed by disc bulge and ligament thickening.          UPPER SACRUM AND SACROILIAC JOINTS: Unremarkable.      IMPRESSION:  Multilevel degenerative changes are present with severe lateral  recess and to lesser  degree spinal canal stenoses most prominent at  L2-3 and L1-2.          I personally reviewed the images/study and I agree with the findings  as stated by Dr. Bruno Hammond. This study was interpreted at  [facility], [place], [place].      MACRO:  None      Signed by: Isidro Brown 12/28/2023 11:25 AM  Dictation workstation:   [id number]       Assessment/Plan     80 yo male with history and physical exam supportive of chronic low back pain associated with lumbar spinal stenosis and lumbar radiculopathy.     We will schedule midline L5- S1 epidural steroid injection under fluoroscopic guidance. Risks and benefits were discussed with patient and patient verbalizes understanding. He is diabetic and we did discuss that he should expect a transient elevation in blood glucose for 2-3 days following the injection.     We will increase gabapentin to 100mg 1 dose in the morning, 1 dose in the afternoon and 2 doses prior to bed.     Follow up in 3 months or as needed.     Explained plan to this patient, and patient verbalized understanding and agreement with the plan.     Please do not hesitate to contact the pain clinic after your visit with any questions or concerns at  M-F 8-4 pm     Patient was reminded not to share medications, not to take prescription medications that were not prescribed to the patient, and not to increase or change dose without consulting the pain clinic. I advised the patient to always take the least amount of medication needed to keep symptoms under control.          Ruchi Rajput, TRACI-CNP 05/10/24 11:28 AM

## 2024-05-10 NOTE — PATIENT INSTRUCTIONS
Increase gabapentin to one pill in the morning, one pill in the afternoon and two pills prior to bed.

## 2024-05-16 ENCOUNTER — TREATMENT (OUTPATIENT)
Dept: PHYSICAL THERAPY | Facility: CLINIC | Age: 79
End: 2024-05-16
Payer: MEDICARE

## 2024-05-16 DIAGNOSIS — M54.9 BACK PAIN: ICD-10-CM

## 2024-05-16 DIAGNOSIS — M54.16 LUMBAR RADICULOPATHY: ICD-10-CM

## 2024-05-16 DIAGNOSIS — M25.69 BACK STIFFNESS: Primary | ICD-10-CM

## 2024-05-16 PROCEDURE — 97110 THERAPEUTIC EXERCISES: CPT | Mod: GP

## 2024-05-16 ASSESSMENT — PAIN - FUNCTIONAL ASSESSMENT: PAIN_FUNCTIONAL_ASSESSMENT: 0-10

## 2024-05-16 ASSESSMENT — PAIN SCALES - GENERAL: PAINLEVEL_OUTOF10: 8

## 2024-05-16 NOTE — PROGRESS NOTES
Physical Therapy    Physical Therapy Treatment      Patient Name: Bob Aceves  MRN: [id number]  Today's Date: 5/16/2024  Time Calculation  Start Time: 1530  Stop Time: 1615  Time Calculation (min): 45 min    Assessment:  Pt continues to demo radicular symptoms during treatment and has difficulty tolerating land. Pt will be better suited for aquatic therapy to maintain his strength and mobility while waiting for an injection.     Plan:  Continue per plan of care as tolerated. Patient to continue with HEP each day independently.      Current Problem:   1. Back stiffness        2. Back pain              Subjective    Pt reports overdoing it yesterday while giving items to his wife while cleaning the car. Pain in the hip and down the leg. Pt reports he has a cortisone injection in June. He has a script for further aquatic therapy.     Precautions:  Precautions  Precautions Comment: Fall around [place] 2023. Was using a cane. Leg gave out stepping off a curb/lip on the ground and fell, hiting face. Bruise to cheek and broke center of glasses. Did not seek medical care, no post concussive sx reported.    Pain:  Pain Assessment  Pain Assessment: 0-10  Pain Score: 8      Objective   5x STS - 22.98s    Lumbar ROM (degrees):  Flexion: 75% p!  Extension: 25% p!  Sidebend R/L: 75%/50% p!   Rotation R/L: 75% p! Upper lateral LLE  HIP AROM (degrees):  Hip Internal Rotation R/L: 20/15  Hip External Rotation R/L:  35/38    Patient able to tolerate PROM IR/ER this date, about the same measurements     Flexibility (degrees):   Hamstring 90/90 position R/L:30 / unable to test L  Posture L leaning  Gait: able to stay within skiis     Strength Testing:  Hip Abduction R/L: 4/3+ NT  Hip Extension R/L:  3/2 NT  Hip Flexion R/L: 4-/4-  Knee extension R/L:  5/5p!  Knee Flexion R/L: 4+/4 p!    Treatments:  Access Code: [email or web address]    Exercises  Gait with FWW   Attempted lumbar rotation supine  Seated posture training- pain with upright posture.  "   YTB resisted ribcage breathing exercising   YTB anti rotation 2x10 devan  Pain with standing heel raise and side step     Not this visit:  Nustep L2 4 min NV  Supine Lower Trunk Rotation   Supine Sciatic Nerve Glide L assisted  Fibular nerve glides  Glute bridge  TA 3x10s  TA + march x 1 min  TA + leg ext x10 ea  TA + walkout 2x5  SB gentle distraction/ rotation  SB DKTC  SB lumbar stretch seated  HS stretch seated  STS 2x10  Standing cone tap  4\" step up  Step calf stretch  DF/PF standing, DF seated Black TB  "

## 2024-05-17 ENCOUNTER — APPOINTMENT (OUTPATIENT)
Dept: PHYSICAL THERAPY | Facility: CLINIC | Age: 79
End: 2024-05-17
Payer: MEDICARE

## 2024-05-23 ENCOUNTER — TREATMENT (OUTPATIENT)
Dept: PHYSICAL THERAPY | Facility: CLINIC | Age: 79
End: 2024-05-23
Payer: MEDICARE

## 2024-05-23 ENCOUNTER — APPOINTMENT (OUTPATIENT)
Dept: PHYSICAL THERAPY | Facility: CLINIC | Age: 79
End: 2024-05-23
Payer: MEDICARE

## 2024-05-23 DIAGNOSIS — M54.16 LUMBAR RADICULOPATHY: ICD-10-CM

## 2024-05-23 DIAGNOSIS — M54.9 BACK PAIN: ICD-10-CM

## 2024-05-23 DIAGNOSIS — M25.69 BACK STIFFNESS: ICD-10-CM

## 2024-05-23 PROCEDURE — 97113 AQUATIC THERAPY/EXERCISES: CPT | Mod: CQ,GP

## 2024-05-23 ASSESSMENT — PAIN SCALES - GENERAL: PAINLEVEL_OUTOF10: 5 - MODERATE PAIN

## 2024-05-23 ASSESSMENT — PAIN - FUNCTIONAL ASSESSMENT: PAIN_FUNCTIONAL_ASSESSMENT: 0-10

## 2024-05-23 ASSESSMENT — PAIN DESCRIPTION - DESCRIPTORS: DESCRIPTORS: TIGHTNESS;SHARP

## 2024-05-23 NOTE — PROGRESS NOTES
Physical Therapy Treatment    Patient Name: Bob Aceves  MRN: [id number]  Today's Date: 5/23/2024  Time Calculation  Start Time: 1525  Stop Time: 1600  Time Calculation (min): 35 min    Insurance  19 of 20     Current Problem  1. Back stiffness  Follow Up In Physical Therapy      2. Back pain  Follow Up In Physical Therapy      3. Lumbar radiculopathy  Follow Up In Physical Therapy          Subjective    General   Pt returning to pool today following 3 week lapse.  He has been to a couple land visits since last pool visit.  Reports he had strained low back again so did not come to pool & worked instead with supervising PT.  He still has some tightness in low back, but feels he is moving better.  L hip is also sore today.     Precautions  Precautions  Precautions Comment: No recent falls reported    Pain  Pain Assessment  Pain Assessment: 0-10  Pain Score: 5 - Moderate pain  Pain Location:  (Back, L Hip)  Pain Orientation: Lower, Left  Pain Descriptors: Tightness, Sharp (Sharp in L hip, Stiff or tight in low back)  Pain Frequency: Constant/continuous    Objective   No c/o numbness or tingling into L LE    Treatments:  Aquatic Therapy (73060): 35 Minutes, 2 Units    Activities:  Aquatic gait forward/Bwd/Lateral Wall w/ UE assist 3x each   March  1 minute   HS/GS Stretches Gastroc only today 2 x30 (not this visit)  Hip 3 way x15 each MAUREEN  Hip circles CW/CWW x15 MAUREEN  Squats x15  HR/TR x20  DLS 3 way  Level3 trunk against wall x15 each  Noodle Pull down x10   Rows Level 3 x15 trunk against wall   SKTC 2 x30 (not this visit)  HSC x1 minute (not this visit)  Tandem stance 2x30 (not this visit) --> Resume NV      Assessment:   Pt able to resume aquatic activities today with some modifications secondary to lapse in care.  Aquatic gait activities performed with 1 hand assist on wall, but with good tolerance.  Exhibits normal fatigue with remaining activities.  Reports sx's reduced with the activities performed today.  Not all  activities performed this visit, but anticipate he will be able to resume & progress to previous level again at next visit.    Plan:   Resume all activities & progress with POC as tolerated to increase LE/core strength, stability & improve functional mobility, capacity for daily activities.  He has 1 visit remaining on current POC.

## 2024-05-31 ENCOUNTER — APPOINTMENT (OUTPATIENT)
Dept: PHYSICAL THERAPY | Facility: CLINIC | Age: 79
End: 2024-05-31
Payer: MEDICARE

## 2024-06-05 ENCOUNTER — TREATMENT (OUTPATIENT)
Dept: PHYSICAL THERAPY | Facility: CLINIC | Age: 79
End: 2024-06-05
Payer: MEDICARE

## 2024-06-05 DIAGNOSIS — M25.69 BACK STIFFNESS: ICD-10-CM

## 2024-06-05 DIAGNOSIS — M54.9 BACK PAIN: ICD-10-CM

## 2024-06-05 DIAGNOSIS — M54.16 LUMBAR RADICULOPATHY: ICD-10-CM

## 2024-06-05 PROCEDURE — 97113 AQUATIC THERAPY/EXERCISES: CPT | Mod: GP,CQ

## 2024-06-06 ENCOUNTER — APPOINTMENT (OUTPATIENT)
Dept: PHYSICAL THERAPY | Facility: CLINIC | Age: 79
End: 2024-06-06
Payer: MEDICARE

## 2024-06-06 ENCOUNTER — LAB (OUTPATIENT)
Dept: LAB | Facility: LAB | Age: 79
End: 2024-06-06
Payer: MEDICARE

## 2024-06-06 DIAGNOSIS — E11.9 TYPE 2 DIABETES MELLITUS WITHOUT COMPLICATION, UNSPECIFIED WHETHER LONG TERM INSULIN USE (MULTI): ICD-10-CM

## 2024-06-06 LAB
ALBUMIN SERPL BCP-MCNC: 4.7 G/DL (ref 3.4–5)
ALP SERPL-CCNC: 59 U/L (ref 33–136)
ALT SERPL W P-5'-P-CCNC: 18 U/L (ref 10–52)
ANION GAP SERPL CALC-SCNC: 13 MMOL/L (ref 10–20)
AST SERPL W P-5'-P-CCNC: 16 U/L (ref 9–39)
BILIRUB SERPL-MCNC: 0.5 MG/DL (ref 0–1.2)
BUN SERPL-MCNC: 25 MG/DL (ref 6–23)
CALCIUM SERPL-MCNC: 9.5 MG/DL (ref 8.6–10.3)
CHLORIDE SERPL-SCNC: 98 MMOL/L (ref 98–107)
CHOLEST SERPL-MCNC: 100 MG/DL (ref 0–199)
CHOLESTEROL/HDL RATIO: 3.2
CO2 SERPL-SCNC: 29 MMOL/L (ref 21–32)
CREAT SERPL-MCNC: 1.09 MG/DL (ref 0.5–1.3)
EGFRCR SERPLBLD CKD-EPI 2021: 70 ML/MIN/1.73M*2
ERYTHROCYTE [DISTWIDTH] IN BLOOD BY AUTOMATED COUNT: 13.3 % (ref 11.5–14.5)
EST. AVERAGE GLUCOSE BLD GHB EST-MCNC: 171 MG/DL
GLUCOSE SERPL-MCNC: 211 MG/DL (ref 74–99)
HBA1C MFR BLD: 7.6 %
HCT VFR BLD AUTO: 43.1 % (ref 41–52)
HDLC SERPL-MCNC: 31.4 MG/DL
HGB BLD-MCNC: 14.2 G/DL (ref 13.5–17.5)
LDLC SERPL CALC-MCNC: 46 MG/DL
MCH RBC QN AUTO: 32.1 PG (ref 26–34)
MCHC RBC AUTO-ENTMCNC: 32.9 G/DL (ref 32–36)
MCV RBC AUTO: 97 FL (ref 80–100)
NON HDL CHOLESTEROL: 69 MG/DL (ref 0–149)
NRBC BLD-RTO: 0 /100 WBCS (ref 0–0)
PLATELET # BLD AUTO: 180 X10*3/UL (ref 150–450)
POTASSIUM SERPL-SCNC: 4.6 MMOL/L (ref 3.5–5.3)
PROT SERPL-MCNC: 7 G/DL (ref 6.4–8.2)
RBC # BLD AUTO: 4.43 X10*6/UL (ref 4.5–5.9)
SODIUM SERPL-SCNC: 135 MMOL/L (ref 136–145)
TRIGL SERPL-MCNC: 111 MG/DL (ref 0–149)
VIT B12 SERPL-MCNC: 970 PG/ML (ref 211–911)
VLDL: 22 MG/DL (ref 0–40)
WBC # BLD AUTO: 7.6 X10*3/UL (ref 4.4–11.3)

## 2024-06-06 PROCEDURE — 82607 VITAMIN B-12: CPT

## 2024-06-06 PROCEDURE — 80053 COMPREHEN METABOLIC PANEL: CPT

## 2024-06-06 PROCEDURE — 36415 COLL VENOUS BLD VENIPUNCTURE: CPT

## 2024-06-06 PROCEDURE — 83036 HEMOGLOBIN GLYCOSYLATED A1C: CPT

## 2024-06-06 PROCEDURE — 80061 LIPID PANEL: CPT

## 2024-06-06 PROCEDURE — 85027 COMPLETE CBC AUTOMATED: CPT

## 2024-06-10 ENCOUNTER — TREATMENT (OUTPATIENT)
Dept: PHYSICAL THERAPY | Facility: CLINIC | Age: 79
End: 2024-06-10
Payer: MEDICARE

## 2024-06-10 DIAGNOSIS — M25.69 BACK STIFFNESS: Primary | ICD-10-CM

## 2024-06-10 DIAGNOSIS — M54.16 LUMBAR RADICULOPATHY: ICD-10-CM

## 2024-06-10 DIAGNOSIS — M79.662 PAIN OF LEFT LOWER LEG: ICD-10-CM

## 2024-06-10 DIAGNOSIS — M54.9 BACK PAIN: ICD-10-CM

## 2024-06-10 PROCEDURE — 97113 AQUATIC THERAPY/EXERCISES: CPT | Mod: GP,CQ

## 2024-06-10 NOTE — PROGRESS NOTES
Patient Name: Bob Aceves  MRN: [id number]  Today's Date: 6/5/2024  Time Calculation  Start Time: 1330  Stop Time: 1400  Time Calculation (min): 30 min  Insurance  20 of 20  Subjective:  Reports that he is glad to be back in the water due to he is able to exercise with less pain. 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    Objective:  Aquatic performance independently recalled 40%     Assessment:   Pt still requires cues for proper exercise recall. Able to perform and maintain proper form after initial instruction. Pt would benefit from continued aquatic PT to enhance independency with current program to ensure good carry over once discharge  to maintain gained abilities.     Plan:   POC needs to be extended    Treatment :   AQUATIC THERAPEUTIC EXERCISE 21580 28 mins/2 units   Aquatic gait forward/Bwd/Lateral Wall No UE assist 3x each   March  1 minute   HS/GS Stretches Gastroc only today 2 x30 NV  Hip 3 way x15 each  Hip circles CW/CWW x15  Squats x20  HR/TR x20  DLS 3 way  Level1 trunk against wall x15  Noodle Pull down x10   Rows Level 5 x15 trunk against wall   SKTC 2 x30 NV  HSC x1 minute  Tandem stance 2x30 NV           Current Problem:  1. Back stiffness  Follow Up In Physical Therapy      2. Back pain  Follow Up In Physical Therapy      3. Lumbar radiculopathy  Follow Up In Physical Therapy              Pain:  4/10    Location: [facility]        Precautions: No recent new falls reported

## 2024-06-10 NOTE — PROGRESS NOTES
Patient Name: Bob Aceves  MRN: [id number]  Today's Date: 6/10/2024  Time Calculation  Start Time: 1331  Stop Time: 1357  Time Calculation (min): 26 min  Insurance  21  Subjective:  Reports that morning pain is the worst . Intense radicular pain that radiates down L LE into foot. Performs stretching which helps some.  Lumbar injection tomorrow.     Objective:  Gait Decreased loading L LE with gait  ww for support.     Assessment:   Pt completed session with reports of increased LE tightness .Increased tightness reported as session progressed.     Plan:   Ask about recent injection     Treatment :   AQUATIC THERAPEUTIC EXERCISE 30238 28 mins/2 units   Aquatic gait forward/Bwd/Lateral Wall No UE assist 3x each   March  1 minute   HS/GS Stretches Gastroc only today 2 x30 NV  Hip 3 way x15 each  Hip circles CW/CWW x15  Squats x20  HR/TR x20  DLS 3 way  Level 5 trunk against wall x15  Noodle Pull down x10   Rows Level 5 x15 trunk against wall   SKTC 2 x30 NV  HSC x1 minute  Tandem stance 2x30 NV  Trunk rotation x10             Current Problem:  1. Back stiffness        2. Back pain        3. Lumbar radiculopathy        4. Pain of left lower leg                Pain:  4-5/10    Location: LB/LLE to foot          Precautions: No recent new falls

## 2024-06-11 ENCOUNTER — HOSPITAL ENCOUNTER (OUTPATIENT)
Dept: RADIOLOGY | Facility: HOSPITAL | Age: 79
Discharge: HOME | End: 2024-06-11
Payer: MEDICARE

## 2024-06-11 ENCOUNTER — HOSPITAL ENCOUNTER (OUTPATIENT)
Dept: PAIN MEDICINE | Facility: CLINIC | Age: 79
Discharge: HOME | End: 2024-06-11
Payer: MEDICARE

## 2024-06-11 VITALS
HEART RATE: 81 BPM | DIASTOLIC BLOOD PRESSURE: 70 MMHG | TEMPERATURE: 96.1 F | SYSTOLIC BLOOD PRESSURE: 151 MMHG | OXYGEN SATURATION: 96 % | RESPIRATION RATE: 16 BRPM

## 2024-06-11 DIAGNOSIS — M54.16 LUMBAR RADICULOPATHY: ICD-10-CM

## 2024-06-11 PROCEDURE — 62323 NJX INTERLAMINAR LMBR/SAC: CPT | Performed by: PAIN MEDICINE

## 2024-06-11 PROCEDURE — 2500000004 HC RX 250 GENERAL PHARMACY W/ HCPCS (ALT 636 FOR OP/ED): Mod: JZ | Performed by: PAIN MEDICINE

## 2024-06-11 RX ORDER — BUPIVACAINE HYDROCHLORIDE 2.5 MG/ML
5 INJECTION, SOLUTION EPIDURAL; INFILTRATION; INTRACAUDAL ONCE
Status: DISCONTINUED | OUTPATIENT
Start: 2024-06-11 | End: 2024-06-12 | Stop reason: HOSPADM

## 2024-06-11 RX ORDER — METHYLPREDNISOLONE ACETATE 80 MG/ML
INJECTION, SUSPENSION INTRA-ARTICULAR; INTRALESIONAL; INTRAMUSCULAR; SOFT TISSUE AS NEEDED
Status: COMPLETED | OUTPATIENT
Start: 2024-06-11 | End: 2024-06-11

## 2024-06-11 RX ORDER — BUPIVACAINE HYDROCHLORIDE 2.5 MG/ML
INJECTION, SOLUTION EPIDURAL; INFILTRATION; INTRACAUDAL AS NEEDED
Status: COMPLETED | OUTPATIENT
Start: 2024-06-11 | End: 2024-06-11

## 2024-06-11 RX ORDER — METHYLPREDNISOLONE ACETATE 40 MG/ML
80 INJECTION, SUSPENSION INTRA-ARTICULAR; INTRALESIONAL; INTRAMUSCULAR; SOFT TISSUE ONCE
Status: DISCONTINUED | OUTPATIENT
Start: 2024-06-11 | End: 2024-06-12 | Stop reason: HOSPADM

## 2024-06-11 RX ADMIN — METHYLPREDNISOLONE ACETATE 80 MG: 80 INJECTION, SUSPENSION INTRA-ARTICULAR; INTRALESIONAL; INTRAMUSCULAR; SOFT TISSUE at 09:19

## 2024-06-11 RX ADMIN — BUPIVACAINE HYDROCHLORIDE 10 ML: 2.5 INJECTION, SOLUTION EPIDURAL; INFILTRATION; INTRACAUDAL; PERINEURAL at 09:19

## 2024-06-11 ASSESSMENT — ENCOUNTER SYMPTOMS
OCCASIONAL FEELINGS OF UNSTEADINESS: 1
LOSS OF SENSATION IN FEET: 0
DEPRESSION: 0

## 2024-06-11 ASSESSMENT — PAIN SCALES - GENERAL: PAINLEVEL_OUTOF10: 6

## 2024-06-11 ASSESSMENT — PAIN - FUNCTIONAL ASSESSMENT: PAIN_FUNCTIONAL_ASSESSMENT: 0-10

## 2024-06-11 ASSESSMENT — PAIN DESCRIPTION - DESCRIPTORS: DESCRIPTORS: ACHING

## 2024-06-11 NOTE — DISCHARGE INSTRUCTIONS
Post-injection instructions:    Your pain may not be gone immediately after the procedure--it usually takes the steroid 3-5 days to start working.   It may take several weeks for the medicine to reach its' full effect.   Pay attention to how much pain relief (what percentage compared to before the procedure) you get and for how long it lasts.     Activity: Avoid strenuous activity for 24 hours. After that return to your normal activity level.     Bandages: Remove after 24 hours     Showering/Bathing: You may shower after bandage is removed     Follow up: CALL OFFICE IN 7 DAYS [phone] LEAVE MESSAGE ABOUT THE RELIEF THAT WAS OBTAINED      Call the doctor immediately: if you notice:     Excessive bleeding from procedure site (brisk bright red bleeding from the site or bleeding that soaks the bandages or does not stop)   Severe headache  Inability to walk, leg or arm weakness or numbness that is worse after the procedure   Uncontrolled pain   New urinary or fecal incontinence   Signs of infection: Fever above 101.5F, redness, swelling, pus or drainage from the site    Epidural Injection    Why is this procedure done?  With an epidural injection, the doctor injects drugs deep into the area around your spinal cord. This is different than epidural anesthesia that is used for surgery or when a woman has a baby. Your spine is a group of bones in your back that protect the nerves in your spinal cord. Problems with your spine can cause swollen nerves in the spinal cord. This swelling leads to pain and can limit movement. In an epidural injection, the doctor may give you a drug to help with swelling and pain.  You may have an epidural injection in different parts of your back, based on where your pain is. For pain in your head or arms, you may have a cervical epidural injection. If your pain is in your upper or middle back, you may get a thoracic epidural injection. For pain in your lower back or legs, you may get a  lumbar epidural injection.    What will the results be?  The treatment may:  Lower pain  Reduce swelling in the nerves  Improve movement  What happens before the procedure?  Your doctor will take your history. Talk to the doctor about:  All the drugs you are taking. Be sure to include all prescription and over-the-counter (OTC) drugs, and herbal supplements. Tell the doctor about any drug allergy. Bring a list of drugs you take with you.  If you have high blood sugar or diabetes. Your drugs may need to be changed.  Any bleeding problems. Be sure to tell your doctor if you are taking any drugs that may cause bleeding. Some of these are warfarin, rivaroxaban, apixaban, ticagrelor, clopidogrel, ketorolac, ibuprofen, naproxen, or aspirin. Certain vitamins and herbs, such as garlic and fish oil, may also add to the risk for bleeding. You may need to stop these drugs as well. Talk to your doctor about them.  Tell the doctor if you are pregnant.  You will not be allowed to drive right away after the procedure. Ask a family member or a friend to drive you home.  What happens during the procedure?  To help the doctor make sure the drugs are being injected in the right place, your doctor may do an x-ray of your spine. Other times your doctor may do a continuous x-ray during the procedure. This is a fluoroscopy. The doctor may also use a colored dye or a contrast dye to check where to inject the drug.  You may be given a drug to help you relax. You may be given a drug to make the area of the injection numb.  The doctor will clean the skin on your back or neck. This helps prevent infection.  The doctor will put a needle through the skin toward your spine. The drug will be injected into a space near the spine.  The needle will be taken out and a bandage will be placed over the injection site.  What happens after the procedure?  Staff will check on you to make sure you are doing well. They will tell you when you can go  home.  What care is needed at home?  Relax on the day of the injection.  Do not drive or run machines for at least 12 hours afterwards.  Apply ice to the injection site. Place an ice pack or a bag of frozen peas wrapped in a towel over the painful part. Never put ice right on the skin. Do not leave the ice on more than 10 to 15 minutes at a time.  It may take a few days before you will feel the effects of the injection.  What follow-up care is needed?  Your doctor may ask you to make visits to the office to check on your progress. Be sure to keep these visits. You may also need to see a physical therapist (PT). The PT will teach you exercises to help you get back your strength and motion. Ask your doctor when you can exercise.  What problems could happen?  Bleeding  Infection (rare)  Headache  Nerve injury  If you have diabetes, your blood sugar can go up after the injection. Check with your doctor if you need more treatment for this.  Last Reviewed Date

## 2024-06-13 ENCOUNTER — APPOINTMENT (OUTPATIENT)
Dept: PRIMARY CARE | Facility: CLINIC | Age: 79
End: 2024-06-13
Payer: MEDICARE

## 2024-06-13 VITALS
TEMPERATURE: 98.2 F | WEIGHT: 175 LBS | OXYGEN SATURATION: 95 % | SYSTOLIC BLOOD PRESSURE: 124 MMHG | HEART RATE: 64 BPM | HEIGHT: 71 IN | BODY MASS INDEX: 24.5 KG/M2 | DIASTOLIC BLOOD PRESSURE: 78 MMHG | RESPIRATION RATE: 16 BRPM

## 2024-06-13 DIAGNOSIS — I25.2 STATUS POST MYOCARDIAL INFARCTION: ICD-10-CM

## 2024-06-13 DIAGNOSIS — E55.9 VITAMIN D DEFICIENCY: ICD-10-CM

## 2024-06-13 DIAGNOSIS — I10 PRIMARY HYPERTENSION: ICD-10-CM

## 2024-06-13 DIAGNOSIS — E11.9 TYPE 2 DIABETES MELLITUS WITHOUT COMPLICATION, UNSPECIFIED WHETHER LONG TERM INSULIN USE (MULTI): ICD-10-CM

## 2024-06-13 DIAGNOSIS — Z00.00 HEALTHCARE MAINTENANCE: Primary | ICD-10-CM

## 2024-06-13 DIAGNOSIS — L98.9 SKIN LESION: ICD-10-CM

## 2024-06-13 DIAGNOSIS — E78.2 MIXED HYPERLIPIDEMIA: ICD-10-CM

## 2024-06-13 DIAGNOSIS — Z95.1 S/P CABG (CORONARY ARTERY BYPASS GRAFT): ICD-10-CM

## 2024-06-13 PROCEDURE — 1170F FXNL STATUS ASSESSED: CPT | Performed by: INTERNAL MEDICINE

## 2024-06-13 PROCEDURE — 99214 OFFICE O/P EST MOD 30 MIN: CPT | Performed by: INTERNAL MEDICINE

## 2024-06-13 PROCEDURE — 1159F MED LIST DOCD IN RCRD: CPT | Performed by: INTERNAL MEDICINE

## 2024-06-13 PROCEDURE — 1158F ADVNC CARE PLAN TLK DOCD: CPT | Performed by: INTERNAL MEDICINE

## 2024-06-13 PROCEDURE — 1160F RVW MEDS BY RX/DR IN RCRD: CPT | Performed by: INTERNAL MEDICINE

## 2024-06-13 PROCEDURE — 3078F DIAST BP <80 MM HG: CPT | Performed by: INTERNAL MEDICINE

## 2024-06-13 PROCEDURE — 1036F TOBACCO NON-USER: CPT | Performed by: INTERNAL MEDICINE

## 2024-06-13 PROCEDURE — 3074F SYST BP LT 130 MM HG: CPT | Performed by: INTERNAL MEDICINE

## 2024-06-13 PROCEDURE — 1123F ACP DISCUSS/DSCN MKR DOCD: CPT | Performed by: INTERNAL MEDICINE

## 2024-06-13 PROCEDURE — G0439 PPPS, SUBSEQ VISIT: HCPCS | Performed by: INTERNAL MEDICINE

## 2024-06-13 ASSESSMENT — ACTIVITIES OF DAILY LIVING (ADL)
DRESSING: INDEPENDENT
BATHING: INDEPENDENT
GROCERY_SHOPPING: INDEPENDENT
TAKING_MEDICATION: INDEPENDENT
DOING_HOUSEWORK: INDEPENDENT
MANAGING_FINANCES: INDEPENDENT

## 2024-06-13 ASSESSMENT — ENCOUNTER SYMPTOMS
COUGH: 0
FATIGUE: 0
LOSS OF SENSATION IN FEET: 0
OCCASIONAL FEELINGS OF UNSTEADINESS: 1
SHORTNESS OF BREATH: 0
FEVER: 0
DEPRESSION: 0

## 2024-06-13 NOTE — PROGRESS NOTES
"Subjective   Reason for Visit: Bob Aceves is an 79 y.o. male here for a Medicare Wellness visit.     Past Medical, Surgical, and Family History reviewed and updated in chart.    Reviewed all medications by prescribing practitioner or clinical pharmacist (such as prescriptions, OTCs, herbal therapies and supplements) and documented in the medical record.    HPI  Influenza 2023  Covid 2021, 2022, 2023  PCV13 2018  PPSV23 2014  Shingrix 2020, 2021  Tdap 2016  Cologuard 2022  Adv Dir Yes  Psa 11/23  Depression screen 24  Bmi 24  Eye exam 6/24  Fall risk 24    Bs record  Injections with pain mgmt    Patient Care Team:  Lina Beltrán DO as PCP - General  Lina Beltrán DO as PCP - [employer] PCP  Pepper Acevedo MD as Surgeon (Neurosurgery)  Joe Orourke MD as Consulting Physician (Cardiology)     Review of Systems   Constitutional:  Negative for fatigue and fever.   Respiratory:  Negative for cough and shortness of breath.    Cardiovascular:  Negative for chest pain and leg swelling.   All other systems reviewed and are negative.      Objective   Vitals:  /78   Pulse 64   Temp 36.8 °C (98.2 °F)   Resp 16   Ht 1.803 m (5' 11\")   Wt 79.4 kg (175 lb)   SpO2 95%   BMI 24.41 kg/m²       Physical Exam  Vitals and nursing note reviewed.   Constitutional:       Appearance: Normal appearance.   HENT:      Head: Normocephalic.   Eyes:      Conjunctiva/sclera: Conjunctivae normal.      Pupils: Pupils are equal, round, and reactive to light.   Cardiovascular:      Rate and Rhythm: Normal rate and regular rhythm.      Pulses: Normal pulses.      Heart sounds: Normal heart sounds.   Pulmonary:      Effort: Pulmonary effort is normal.      Breath sounds: Normal breath sounds.   Musculoskeletal:         General: No swelling.      Cervical back: Neck supple.   Skin:     General: Skin is warm and dry.   Neurological:      General: No focal deficit present.      Mental Status: He is oriented to " person, place, and time.         Assessment/Plan   Problem List Items Addressed This Visit       Diabetes mellitus (Multi)    Relevant Orders    Lipid Panel    CBC    Comprehensive Metabolic Panel    Hemoglobin A1C    Vitamin B12    Hypertension    Hyperlipidemia    Relevant Orders    Thyroid Stimulating Hormone    S/P CABG (coronary artery bypass graft)    Status post myocardial infarction    Vitamin D deficiency    Relevant Orders    Vitamin D 25-Hydroxy,Total (for eval of Vitamin D levels)     Other Visit Diagnoses       Healthcare maintenance    -  Primary    Skin lesion        Relevant Orders    Referral to Dermatology        Update preventive  Cont to monitor sugar  Diet control  Reviewed labs  Cont meds  Stable based on symptoms and exam.  Continue established treatment plan and follow up at least yearly.       Patient was identified as a fall risk. Risk prevention instructions provided.

## 2024-06-18 ENCOUNTER — TELEPHONE (OUTPATIENT)
Dept: PAIN MEDICINE | Facility: CLINIC | Age: 79
End: 2024-06-18
Payer: MEDICARE

## 2024-06-18 NOTE — TELEPHONE ENCOUNTER
Patient called states that he had 80% relief from lumbar epidural. Patient states that he still has relief.

## 2024-06-19 ENCOUNTER — TREATMENT (OUTPATIENT)
Dept: PHYSICAL THERAPY | Facility: CLINIC | Age: 79
End: 2024-06-19
Payer: MEDICARE

## 2024-06-19 DIAGNOSIS — M54.9 BACK PAIN: ICD-10-CM

## 2024-06-19 DIAGNOSIS — M25.69 BACK STIFFNESS: Primary | ICD-10-CM

## 2024-06-19 DIAGNOSIS — M54.16 LUMBAR RADICULOPATHY: ICD-10-CM

## 2024-06-19 DIAGNOSIS — M79.662 PAIN OF LEFT LOWER LEG: ICD-10-CM

## 2024-06-19 PROCEDURE — 97113 AQUATIC THERAPY/EXERCISES: CPT | Mod: GP,CQ

## 2024-06-19 NOTE — PROGRESS NOTES
"Patient Name: Bob Aceves  MRN: [id number]  Today's Date: 6/19/2024  Time Calculation  Start Time: 1300  Stop Time: 1330  Time Calculation (min): 30 min  Insurance  22 of 30   Subjective:  Reports that recent lumbar injection has helped decreased pain intensity by approximately 80% . \"Annoying pain now \"  Pain in L LE to knee     Objective:  Radicular pain down L LE to knee with 30 minute session.    Assessment:   No increased in L LE radicular symptoms today which is progress due to previously sessions pain intensity would increase.      Plan:   Cont per POC     Treatment :   AQUATIC THERAPEUTIC EXERCISE 16363 30 mins/2 units   Aquatic gait forward/Bwd/Lateral Wall No UE assist 3x each   March  1 minute   HS/GS Stretches 2 x30   Hip 3 way 2  x  10 each  Hip circles CW/CWW x15  Squats x20  HR/TR x20  DLS 3 way  Level 5 trunk against wall x15  Noodle Pull down x10   Rows Level 5 x15 trunk against wall   SKTC 2 x30 NV  HSC x1 minute  Tandem stance 2x30   Trunk rotation x10    SL dumbbell teal and yellow flexion/abduction x10 each                Current Problem:  1. Back stiffness  Follow Up In Physical Therapy      2. Back pain  Follow Up In Physical Therapy      3. Lumbar radiculopathy  Follow Up In Physical Therapy      4. Pain of left lower leg                Pain:  3-4/10   Location : LLE         Precautions: No new falls reported         "

## 2024-06-26 ENCOUNTER — TREATMENT (OUTPATIENT)
Dept: PHYSICAL THERAPY | Facility: CLINIC | Age: 79
End: 2024-06-26
Payer: MEDICARE

## 2024-06-26 DIAGNOSIS — M54.9 BACK PAIN: ICD-10-CM

## 2024-06-26 DIAGNOSIS — M54.16 LUMBAR RADICULOPATHY: ICD-10-CM

## 2024-06-26 DIAGNOSIS — M25.69 BACK STIFFNESS: ICD-10-CM

## 2024-06-26 PROCEDURE — 97113 AQUATIC THERAPY/EXERCISES: CPT | Mod: GP,CQ

## 2024-06-26 NOTE — PROGRESS NOTES
Patient Name: Bob Aceves  MRN: [id number]  Today's Date: 6/26/2024  Time Calculation  Start Time: 1330  Stop Time: 1350  Time Calculation (min): 20 min  Insurance  23 of 30   Subjective:  Reports that pain levels are more controlled now. AM pain is the worse. . Performs stretching and exercises which helps.     Objective:  Ambulates up/ down ramp with 1 min UE assist .     Assessment:   Decreased duration due to closing of the pool due to weather. Pt encouraged to continue aquatic PT independently due to a fitness center member. Pt requires no recorrection on proper form with exercises however does require assist for exercise recall still.     Plan:   Next session re eval     Treatment :   Aquatic gait forward/Bwd/Lateral Wall No UE assist 3x each   March  1 minute   HS/GS Stretches 2 x30   Hip 3 way 2  x  10 each  Hip circles CW/CWW x15  Squats x20  HR/TR x20  DLS 3 way  Level 5 trunk against wall x15 NV   Noodle Pull down x10  NV   Rows Level 5 x15 trunk against wall  NV  SKTC 2 x30 NV  HSC x1 minute  Tandem stance 2x30   Trunk rotation x10 NV    SL dumbbell teal and yellow flexion/abduction x10 each  NV            Current Problem:  1. Back stiffness  Follow Up In Physical Therapy      2. Back pain  Follow Up In Physical Therapy      3. Lumbar radiculopathy  Follow Up In Physical Therapy              Pain:  2-3/10   Location: LB /LLE to knee     Precautions: No recent falls

## 2024-07-01 ENCOUNTER — APPOINTMENT (OUTPATIENT)
Dept: PHYSICAL THERAPY | Facility: CLINIC | Age: 79
End: 2024-07-01
Payer: MEDICARE

## 2024-07-02 ENCOUNTER — TELEPHONE (OUTPATIENT)
Dept: PRIMARY CARE | Facility: CLINIC | Age: 79
End: 2024-07-02
Payer: MEDICARE

## 2024-07-02 NOTE — TELEPHONE ENCOUNTER
Blood sugar continues to be elevated   This    Received epidural back injection a few weeks ago.   Please advise

## 2024-07-03 ENCOUNTER — APPOINTMENT (OUTPATIENT)
Dept: PHYSICAL THERAPY | Facility: CLINIC | Age: 79
End: 2024-07-03
Payer: MEDICARE

## 2024-07-08 NOTE — PROGRESS NOTES
Patient Name: Bob Aceves  MRN: [id number]  Today's Date: 7/10/2024  Time Calculation  Start Time: 1303  Stop Time: 1332  Time Calculation (min): 29 min     Diagnosis:  1. Back stiffness  Follow Up In Physical Therapy      2. Back pain  Follow Up In Physical Therapy      3. Lumbar radiculopathy  Follow Up In Physical Therapy          Insurance:  24 of 30   Subjective:  Reports that pain levels are more controlled now. AM pain is the worse. . Performs stretching and exercises which helps.     Precautions:  Precautions  Precautions Comment: no new falls    Subjective:  Patient reports that he is feeling better since starting the pool.  States that the steroid injection 3 weeks ago which has helped.  States most of pain is in L back, hip, proximal thigh.  Patient joined [employer] and his complex place has a pool so he can continue his HEP indep post DC.  Patient is aware of DC from PT this visit.     Pain:  Pain Assessment: 0-10  0-10 (Numeric) Pain Score:  (2-3 current, morning 7-8 stiffness but pain gets better after he gets up.)      Objective:  Lumbar ROM (degrees):  Flexion: 75% minimal lumbar curve reversal   Extension: 25%   Sidebend R/L: 50% devan  Rotation R/L: NT  HIP AROM (degrees):  Hip Internal Rotation R/L: moderate restriction  Hip External Rotation R/L:  mild restriction     Flexibility (degrees):   Hamstring 90/90 position R/L:30 devan  Rectus (measured prone without spine motion) R/L: 100 discomfort / 110     Strength Testing:  Hip Abduction R/L: 4/3  Hip Extension R/L:  3+/3+  Hip Flexion R/L: 4-/4-  Knee extension R/L:  5/5  Knee Flexion R/L: 4+/4+  DF R/L: 5/4  Great toe extension R/L: nt    Outcome Measure:  Other Measures  5x Sit to Stand: 22  Oswestry Disablity Index (APRIL): 44%   Required UE assist on last 3 STS for safety  Treatment:  There ex 54576:  29/2  + reassessment    Assessment:  Reassessment for DC this visit.   Patient has achieved some  PT and personal goals and is pleased with his  progress and current status with the PT and cortisone combo.  He cont to have pain and stiffness and limited strength and ROM and it was recommended that patient follow up with pain MD if his symptoms return for possible other injections.  He is indep with HEP and understands how to adjust, progress, and importance of continuing.  He joined the  fitness center so he can continue indep with his aquatic ex.  HE understands DC planning and agrees.     Plan:   Discontinue PT services.     HEP/Education:  Indep with aquatic program and has access to a pool     Goals: status 7.10.24  Lumbar goals  Patient will verbalize pain (0-10) less than or equal to  0/10 at rest, and  2 /10 with activity. PARTIALLY ACHIEVED. PAIN IS IMPROVED AND IS MOSTLY A 2-3/10 BUT STATES HIS PAIN CAN STILL BE ELEVATED IN THE MORNINGS UNTIL HE GETS STRETCHED OUT AND MOVING.     Modified Oswestry Disability Questionnaire (MODQ) rating score less than or equal to 10% to demonstrate overall improved functional abilities. NOT ACHIEVED - CURRENT SCORE 44%     Lumbar AROM will be greater than or equal to: Flexion , Extension and Left and Right Side Bending 100%, to improve joint mechanics during ADL's. NOT ACHIEVED.  CONT TO HAVE STIFFNESS IN TRUNK MOBILITY WITH MINIMAL CHANGE IN ROM       Patient will correctly perform home exercise program to progress current functional status. ACHIEVED WITH AQUATIC EX      Strength will improve to greater than or equal to 4/5 for hip abduction/extension with improved recruitment to improve joint stability during ADL's NOT ACHIEVED     Hamstring flexibility will improve to 20 ° or better in 90°/90° position. NT     Rectus flexibility in prone will be greater than or equal to 120° without spine motion to decrease strain on the spine in standing and walking. NT     Patient will have improved lumbar and pelvic alignment consistently to decrease pain in the lumbosacral complex. PARTIALLY ACHIEVED IMPROVED - STANDS IN  NEUTRAL WITH OUT DEVIATION

## 2024-07-10 ENCOUNTER — TREATMENT (OUTPATIENT)
Dept: PHYSICAL THERAPY | Facility: CLINIC | Age: 79
End: 2024-07-10
Payer: MEDICARE

## 2024-07-10 DIAGNOSIS — M54.9 BACK PAIN: ICD-10-CM

## 2024-07-10 DIAGNOSIS — M54.16 LUMBAR RADICULOPATHY: ICD-10-CM

## 2024-07-10 DIAGNOSIS — M25.69 BACK STIFFNESS: ICD-10-CM

## 2024-07-10 PROCEDURE — 97110 THERAPEUTIC EXERCISES: CPT | Mod: GP | Performed by: PHYSICAL THERAPIST

## 2024-07-10 ASSESSMENT — PAIN - FUNCTIONAL ASSESSMENT: PAIN_FUNCTIONAL_ASSESSMENT: 0-10

## 2024-07-17 DIAGNOSIS — I10 PRIMARY HYPERTENSION: Primary | ICD-10-CM

## 2024-07-17 DIAGNOSIS — E11.9 DM TYPE 2 WITHOUT RETINOPATHY (MULTI): ICD-10-CM

## 2024-07-17 RX ORDER — LISINOPRIL 10 MG/1
15 TABLET ORAL 2 TIMES DAILY
Qty: 300 TABLET | Refills: 2 | Status: SHIPPED | OUTPATIENT
Start: 2024-07-17

## 2024-07-17 RX ORDER — GLIMEPIRIDE 1 MG/1
TABLET ORAL
Qty: 300 TABLET | Refills: 2 | Status: SHIPPED | OUTPATIENT
Start: 2024-07-17

## 2024-08-03 DIAGNOSIS — I10 HYPERTENSION, UNSPECIFIED TYPE: ICD-10-CM

## 2024-08-05 RX ORDER — METOPROLOL TARTRATE 25 MG/1
25 TABLET, FILM COATED ORAL 2 TIMES DAILY
Qty: 200 TABLET | Refills: 1 | Status: SHIPPED | OUTPATIENT
Start: 2024-08-05

## 2024-08-09 ENCOUNTER — TELEPHONE (OUTPATIENT)
Dept: PEDIATRICS | Facility: CLINIC | Age: 79
End: 2024-08-09
Payer: MEDICARE

## 2024-08-09 DIAGNOSIS — E11.9 DM TYPE 2 WITHOUT RETINOPATHY (MULTI): ICD-10-CM

## 2024-08-09 NOTE — TELEPHONE ENCOUNTER
Pt called nrv  rachel (OneTouch Delica Plus Lancet) 30 gauge misc  [facility] in [facility] [place] is shut down please advise.  Pt has one day left.  Please advise

## 2024-08-12 RX ORDER — BLOOD-GLUCOSE CONTROL, NORMAL
EACH MISCELLANEOUS
Qty: 300 EACH | Refills: 2 | Status: SHIPPED | OUTPATIENT
Start: 2024-08-12 | End: 2024-08-12 | Stop reason: SDUPTHER

## 2024-08-12 RX ORDER — BLOOD-GLUCOSE CONTROL, NORMAL
EACH MISCELLANEOUS
Qty: 300 EACH | Refills: 2 | Status: SHIPPED | OUTPATIENT
Start: 2024-08-12

## 2024-08-13 ENCOUNTER — TELEPHONE (OUTPATIENT)
Dept: PRIMARY CARE | Facility: CLINIC | Age: 79
End: 2024-08-13
Payer: MEDICARE

## 2024-08-13 DIAGNOSIS — Z79.4 TYPE 2 DIABETES MELLITUS WITHOUT COMPLICATION, WITH LONG-TERM CURRENT USE OF INSULIN (MULTI): ICD-10-CM

## 2024-08-13 DIAGNOSIS — E11.9 TYPE 2 DIABETES MELLITUS WITHOUT COMPLICATION, WITH LONG-TERM CURRENT USE OF INSULIN (MULTI): ICD-10-CM

## 2024-08-13 RX ORDER — INSULIN GLARGINE 100 [IU]/ML
10 INJECTION, SOLUTION SUBCUTANEOUS NIGHTLY
Qty: 3 ML | Refills: 11 | Status: SHIPPED | OUTPATIENT
Start: 2024-08-13 | End: 2025-08-13

## 2024-08-13 NOTE — TELEPHONE ENCOUNTER
Patient calling is confusion with medicine he actually need the Lantasolo star pens to be sent to [facility].  Not needles thank you

## 2024-10-01 DIAGNOSIS — E11.9 DM TYPE 2 WITHOUT RETINOPATHY (MULTI): ICD-10-CM

## 2024-10-01 RX ORDER — BLOOD-GLUCOSE METER
EACH MISCELLANEOUS
Qty: 300 STRIP | Refills: 2 | Status: SHIPPED | OUTPATIENT
Start: 2024-10-01

## 2024-10-07 ENCOUNTER — LAB (OUTPATIENT)
Dept: LAB | Facility: LAB | Age: 79
End: 2024-10-07
Payer: MEDICARE

## 2024-10-07 DIAGNOSIS — E55.9 VITAMIN D DEFICIENCY: ICD-10-CM

## 2024-10-07 DIAGNOSIS — E78.2 MIXED HYPERLIPIDEMIA: ICD-10-CM

## 2024-10-07 DIAGNOSIS — E11.9 TYPE 2 DIABETES MELLITUS WITHOUT COMPLICATION, UNSPECIFIED WHETHER LONG TERM INSULIN USE (MULTI): ICD-10-CM

## 2024-10-07 LAB
25(OH)D3 SERPL-MCNC: 68 NG/ML (ref 30–100)
ALBUMIN SERPL BCP-MCNC: 4.5 G/DL (ref 3.4–5)
ALP SERPL-CCNC: 72 U/L (ref 33–136)
ALT SERPL W P-5'-P-CCNC: 14 U/L (ref 10–52)
ANION GAP SERPL CALC-SCNC: 12 MMOL/L (ref 10–20)
AST SERPL W P-5'-P-CCNC: 12 U/L (ref 9–39)
BILIRUB SERPL-MCNC: 0.6 MG/DL (ref 0–1.2)
BUN SERPL-MCNC: 27 MG/DL (ref 6–23)
CALCIUM SERPL-MCNC: 9.4 MG/DL (ref 8.6–10.3)
CHLORIDE SERPL-SCNC: 102 MMOL/L (ref 98–107)
CHOLEST SERPL-MCNC: 107 MG/DL (ref 0–199)
CHOLESTEROL/HDL RATIO: 3.7
CO2 SERPL-SCNC: 28 MMOL/L (ref 21–32)
CREAT SERPL-MCNC: 1.1 MG/DL (ref 0.5–1.3)
EGFRCR SERPLBLD CKD-EPI 2021: 69 ML/MIN/1.73M*2
ERYTHROCYTE [DISTWIDTH] IN BLOOD BY AUTOMATED COUNT: 12.8 % (ref 11.5–14.5)
EST. AVERAGE GLUCOSE BLD GHB EST-MCNC: 203 MG/DL
GLUCOSE SERPL-MCNC: 207 MG/DL (ref 74–99)
HBA1C MFR BLD: 8.7 %
HCT VFR BLD AUTO: 42 % (ref 41–52)
HDLC SERPL-MCNC: 28.7 MG/DL
HGB BLD-MCNC: 14.4 G/DL (ref 13.5–17.5)
LDLC SERPL CALC-MCNC: 52 MG/DL
MCH RBC QN AUTO: 32.1 PG (ref 26–34)
MCHC RBC AUTO-ENTMCNC: 34.3 G/DL (ref 32–36)
MCV RBC AUTO: 94 FL (ref 80–100)
NON HDL CHOLESTEROL: 78 MG/DL (ref 0–149)
NRBC BLD-RTO: 0 /100 WBCS (ref 0–0)
PLATELET # BLD AUTO: 163 X10*3/UL (ref 150–450)
POTASSIUM SERPL-SCNC: 4.5 MMOL/L (ref 3.5–5.3)
PROT SERPL-MCNC: 7 G/DL (ref 6.4–8.2)
RBC # BLD AUTO: 4.48 X10*6/UL (ref 4.5–5.9)
SODIUM SERPL-SCNC: 137 MMOL/L (ref 136–145)
TRIGL SERPL-MCNC: 130 MG/DL (ref 0–149)
TSH SERPL-ACNC: 1.47 MIU/L (ref 0.44–3.98)
VIT B12 SERPL-MCNC: 1204 PG/ML (ref 211–911)
VLDL: 26 MG/DL (ref 0–40)
WBC # BLD AUTO: 7.5 X10*3/UL (ref 4.4–11.3)

## 2024-10-07 PROCEDURE — 80053 COMPREHEN METABOLIC PANEL: CPT

## 2024-10-07 PROCEDURE — 82607 VITAMIN B-12: CPT

## 2024-10-07 PROCEDURE — 36415 COLL VENOUS BLD VENIPUNCTURE: CPT

## 2024-10-07 PROCEDURE — 84443 ASSAY THYROID STIM HORMONE: CPT

## 2024-10-07 PROCEDURE — 82306 VITAMIN D 25 HYDROXY: CPT

## 2024-10-07 PROCEDURE — 85027 COMPLETE CBC AUTOMATED: CPT

## 2024-10-07 PROCEDURE — 80061 LIPID PANEL: CPT

## 2024-10-07 PROCEDURE — 83036 HEMOGLOBIN GLYCOSYLATED A1C: CPT

## 2024-10-10 ENCOUNTER — TELEPHONE (OUTPATIENT)
Dept: PRIMARY CARE | Facility: CLINIC | Age: 79
End: 2024-10-10
Payer: MEDICARE

## 2024-10-15 ENCOUNTER — APPOINTMENT (OUTPATIENT)
Dept: PRIMARY CARE | Facility: CLINIC | Age: 79
End: 2024-10-15
Payer: MEDICARE

## 2024-10-15 VITALS
HEIGHT: 71 IN | BODY MASS INDEX: 24.36 KG/M2 | OXYGEN SATURATION: 100 % | HEART RATE: 72 BPM | SYSTOLIC BLOOD PRESSURE: 116 MMHG | RESPIRATION RATE: 16 BRPM | TEMPERATURE: 97.8 F | DIASTOLIC BLOOD PRESSURE: 62 MMHG | WEIGHT: 174 LBS

## 2024-10-15 DIAGNOSIS — Z95.1 S/P CABG (CORONARY ARTERY BYPASS GRAFT): Primary | ICD-10-CM

## 2024-10-15 DIAGNOSIS — E11.9 DM TYPE 2 WITHOUT RETINOPATHY (MULTI): ICD-10-CM

## 2024-10-15 DIAGNOSIS — I10 PRIMARY HYPERTENSION: ICD-10-CM

## 2024-10-15 DIAGNOSIS — E78.2 MIXED HYPERLIPIDEMIA: ICD-10-CM

## 2024-10-15 DIAGNOSIS — E11.9 TYPE 2 DIABETES MELLITUS WITHOUT COMPLICATION, UNSPECIFIED WHETHER LONG TERM INSULIN USE (MULTI): ICD-10-CM

## 2024-10-15 PROBLEM — M54.9 BACK PAIN: Status: RESOLVED | Noted: 2024-01-10 | Resolved: 2024-10-15

## 2024-10-15 PROBLEM — D64.9 ANEMIA: Status: RESOLVED | Noted: 2023-06-30 | Resolved: 2024-10-15

## 2024-10-15 PROBLEM — M25.69 BACK STIFFNESS: Status: RESOLVED | Noted: 2024-01-10 | Resolved: 2024-10-15

## 2024-10-15 PROCEDURE — 99214 OFFICE O/P EST MOD 30 MIN: CPT | Performed by: INTERNAL MEDICINE

## 2024-10-15 PROCEDURE — 1123F ACP DISCUSS/DSCN MKR DOCD: CPT | Performed by: INTERNAL MEDICINE

## 2024-10-15 PROCEDURE — 1159F MED LIST DOCD IN RCRD: CPT | Performed by: INTERNAL MEDICINE

## 2024-10-15 PROCEDURE — 1160F RVW MEDS BY RX/DR IN RCRD: CPT | Performed by: INTERNAL MEDICINE

## 2024-10-15 PROCEDURE — 1036F TOBACCO NON-USER: CPT | Performed by: INTERNAL MEDICINE

## 2024-10-15 PROCEDURE — 3078F DIAST BP <80 MM HG: CPT | Performed by: INTERNAL MEDICINE

## 2024-10-15 PROCEDURE — 3074F SYST BP LT 130 MM HG: CPT | Performed by: INTERNAL MEDICINE

## 2024-10-15 ASSESSMENT — ENCOUNTER SYMPTOMS
SHORTNESS OF BREATH: 0
FEVER: 0
FATIGUE: 0
COUGH: 0

## 2024-10-15 NOTE — PROGRESS NOTES
"[employer]   Bob Aceves is a 79 y.o. male who presents for Diabetes follow up.    HPI   Monitoring glucose daily.  Reports increase over past month.  Fasting ave: 150-200.  Denies adverse sx's r/t DM.  No dietary changes.  Dinner at 6-6:30pm.  Taking meds as Rx'd.    Review of Systems   Constitutional:  Negative for fatigue and fever.   Respiratory:  Negative for cough and shortness of breath.    Cardiovascular:  Negative for chest pain and leg swelling.   All other systems reviewed and are negative.      Health Maintenance Due   Topic Date Due    Diabetes: Retinopathy Screening  Never done    Hepatitis C Screening  Never done    Diabetes: Urine Protein Screening  05/18/2024    COVID-19 Vaccine (7 - 2023-24 season) 09/01/2024       Objective   /62   Pulse 72   Temp 36.6 °C (97.8 °F)   Resp 16   Ht 1.803 m (5' 11\")   Wt 78.9 kg (174 lb)   SpO2 100%   BMI 24.27 kg/m²     Physical Exam  Vitals and nursing note reviewed.   Constitutional:       Appearance: Normal appearance.   HENT:      Head: Normocephalic.   Eyes:      Conjunctiva/sclera: Conjunctivae normal.      Pupils: Pupils are equal, round, and reactive to light.   Cardiovascular:      Rate and Rhythm: Normal rate and regular rhythm.      Pulses: Normal pulses.      Heart sounds: Normal heart sounds.   Pulmonary:      Effort: Pulmonary effort is normal.      Breath sounds: Normal breath sounds.   Musculoskeletal:         General: No swelling.      Cervical back: Neck supple.   Skin:     General: Skin is warm and dry.   Neurological:      General: No focal deficit present.      Mental Status: He is oriented to person, place, and time.         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  Continue established treatment plan and follow up at least yearly.  Increase tresiba to 15 units  Recheck glucose in 2-3 weeks

## 2024-11-13 DIAGNOSIS — E11.9 DM TYPE 2 WITHOUT RETINOPATHY (MULTI): ICD-10-CM

## 2024-11-13 DIAGNOSIS — E11.9 TYPE 2 DIABETES MELLITUS WITHOUT COMPLICATION, WITH LONG-TERM CURRENT USE OF INSULIN (MULTI): ICD-10-CM

## 2024-11-13 DIAGNOSIS — Z79.4 TYPE 2 DIABETES MELLITUS WITHOUT COMPLICATION, WITH LONG-TERM CURRENT USE OF INSULIN (MULTI): ICD-10-CM

## 2024-11-13 RX ORDER — INSULIN GLARGINE 100 [IU]/ML
15 INJECTION, SOLUTION SUBCUTANEOUS NIGHTLY
Qty: 4.5 ML | Refills: 11 | Status: SHIPPED | OUTPATIENT
Start: 2024-11-13 | End: 2025-11-13

## 2025-01-14 ENCOUNTER — LAB (OUTPATIENT)
Dept: LAB | Facility: LAB | Age: 80
End: 2025-01-14
Payer: MEDICARE

## 2025-01-14 DIAGNOSIS — E11.9 TYPE 2 DIABETES MELLITUS WITHOUT COMPLICATION, UNSPECIFIED WHETHER LONG TERM INSULIN USE (MULTI): ICD-10-CM

## 2025-01-14 LAB
ALBUMIN SERPL BCP-MCNC: 4.6 G/DL (ref 3.4–5)
ALP SERPL-CCNC: 60 U/L (ref 33–136)
ALT SERPL W P-5'-P-CCNC: 13 U/L (ref 10–52)
ANION GAP SERPL CALC-SCNC: 11 MMOL/L (ref 10–20)
AST SERPL W P-5'-P-CCNC: 12 U/L (ref 9–39)
BILIRUB SERPL-MCNC: 0.5 MG/DL (ref 0–1.2)
BUN SERPL-MCNC: 22 MG/DL (ref 6–23)
CALCIUM SERPL-MCNC: 9.4 MG/DL (ref 8.6–10.3)
CHLORIDE SERPL-SCNC: 102 MMOL/L (ref 98–107)
CHOLEST SERPL-MCNC: 104 MG/DL (ref 0–199)
CHOLESTEROL/HDL RATIO: 3.2
CO2 SERPL-SCNC: 29 MMOL/L (ref 21–32)
CREAT SERPL-MCNC: 1.15 MG/DL (ref 0.5–1.3)
EGFRCR SERPLBLD CKD-EPI 2021: 66 ML/MIN/1.73M*2
ERYTHROCYTE [DISTWIDTH] IN BLOOD BY AUTOMATED COUNT: 13.3 % (ref 11.5–14.5)
GLUCOSE SERPL-MCNC: 305 MG/DL (ref 74–99)
HCT VFR BLD AUTO: 42.6 % (ref 41–52)
HDLC SERPL-MCNC: 32.2 MG/DL
HGB BLD-MCNC: 14.2 G/DL (ref 13.5–17.5)
LDLC SERPL CALC-MCNC: 46 MG/DL
MCH RBC QN AUTO: 31.9 PG (ref 26–34)
MCHC RBC AUTO-ENTMCNC: 33.3 G/DL (ref 32–36)
MCV RBC AUTO: 96 FL (ref 80–100)
NON HDL CHOLESTEROL: 72 MG/DL (ref 0–149)
NRBC BLD-RTO: 0 /100 WBCS (ref 0–0)
PLATELET # BLD AUTO: 184 X10*3/UL (ref 150–450)
POTASSIUM SERPL-SCNC: 4.5 MMOL/L (ref 3.5–5.3)
PROT SERPL-MCNC: 6.9 G/DL (ref 6.4–8.2)
RBC # BLD AUTO: 4.45 X10*6/UL (ref 4.5–5.9)
SODIUM SERPL-SCNC: 137 MMOL/L (ref 136–145)
TRIGL SERPL-MCNC: 131 MG/DL (ref 0–149)
VIT B12 SERPL-MCNC: 1864 PG/ML (ref 211–911)
VLDL: 26 MG/DL (ref 0–40)
WBC # BLD AUTO: 7.2 X10*3/UL (ref 4.4–11.3)

## 2025-01-14 PROCEDURE — 80061 LIPID PANEL: CPT

## 2025-01-14 PROCEDURE — 85027 COMPLETE CBC AUTOMATED: CPT

## 2025-01-14 PROCEDURE — 82607 VITAMIN B-12: CPT

## 2025-01-14 PROCEDURE — 83036 HEMOGLOBIN GLYCOSYLATED A1C: CPT

## 2025-01-14 PROCEDURE — 80053 COMPREHEN METABOLIC PANEL: CPT

## 2025-01-15 LAB
EST. AVERAGE GLUCOSE BLD GHB EST-MCNC: 197 MG/DL
HBA1C MFR BLD: 8.5 %

## 2025-01-20 ENCOUNTER — APPOINTMENT (OUTPATIENT)
Dept: PRIMARY CARE | Facility: CLINIC | Age: 80
End: 2025-01-20
Payer: MEDICARE

## 2025-01-20 VITALS
SYSTOLIC BLOOD PRESSURE: 128 MMHG | TEMPERATURE: 98.1 F | HEIGHT: 71 IN | DIASTOLIC BLOOD PRESSURE: 72 MMHG | WEIGHT: 180 LBS | HEART RATE: 68 BPM | OXYGEN SATURATION: 98 % | BODY MASS INDEX: 25.2 KG/M2 | RESPIRATION RATE: 16 BRPM

## 2025-01-20 DIAGNOSIS — E78.2 MIXED HYPERLIPIDEMIA: ICD-10-CM

## 2025-01-20 DIAGNOSIS — Z95.1 S/P CABG (CORONARY ARTERY BYPASS GRAFT): ICD-10-CM

## 2025-01-20 DIAGNOSIS — I10 PRIMARY HYPERTENSION: ICD-10-CM

## 2025-01-20 DIAGNOSIS — E11.9 TYPE 2 DIABETES MELLITUS WITHOUT COMPLICATION, UNSPECIFIED WHETHER LONG TERM INSULIN USE (MULTI): ICD-10-CM

## 2025-01-20 DIAGNOSIS — C61 MALIGNANT NEOPLASM OF PROSTATE (MULTI): ICD-10-CM

## 2025-01-20 DIAGNOSIS — Z79.4 TYPE 2 DIABETES MELLITUS WITHOUT COMPLICATION, WITH LONG-TERM CURRENT USE OF INSULIN (MULTI): Primary | ICD-10-CM

## 2025-01-20 DIAGNOSIS — J44.1 ACUTE EXACERBATION OF CHRONIC OBSTRUCTIVE PULMONARY DISEASE (MULTI): ICD-10-CM

## 2025-01-20 DIAGNOSIS — I25.118 CORONARY ARTERY DISEASE OF NATIVE ARTERY OF NATIVE HEART WITH STABLE ANGINA PECTORIS: ICD-10-CM

## 2025-01-20 DIAGNOSIS — E11.9 TYPE 2 DIABETES MELLITUS WITHOUT COMPLICATION, WITH LONG-TERM CURRENT USE OF INSULIN (MULTI): Primary | ICD-10-CM

## 2025-01-20 DIAGNOSIS — I25.2 STATUS POST MYOCARDIAL INFARCTION: ICD-10-CM

## 2025-01-20 PROCEDURE — G2211 COMPLEX E/M VISIT ADD ON: HCPCS | Performed by: INTERNAL MEDICINE

## 2025-01-20 PROCEDURE — 99214 OFFICE O/P EST MOD 30 MIN: CPT | Performed by: INTERNAL MEDICINE

## 2025-01-20 PROCEDURE — 3074F SYST BP LT 130 MM HG: CPT | Performed by: INTERNAL MEDICINE

## 2025-01-20 PROCEDURE — 1158F ADVNC CARE PLAN TLK DOCD: CPT | Performed by: INTERNAL MEDICINE

## 2025-01-20 PROCEDURE — 1160F RVW MEDS BY RX/DR IN RCRD: CPT | Performed by: INTERNAL MEDICINE

## 2025-01-20 PROCEDURE — 3078F DIAST BP <80 MM HG: CPT | Performed by: INTERNAL MEDICINE

## 2025-01-20 PROCEDURE — 1159F MED LIST DOCD IN RCRD: CPT | Performed by: INTERNAL MEDICINE

## 2025-01-20 PROCEDURE — 1123F ACP DISCUSS/DSCN MKR DOCD: CPT | Performed by: INTERNAL MEDICINE

## 2025-01-20 RX ORDER — METFORMIN HYDROCHLORIDE 1000 MG/1
TABLET ORAL
Qty: 180 TABLET | Refills: 3 | Status: SHIPPED | OUTPATIENT
Start: 2025-01-20

## 2025-01-20 RX ORDER — PEN NEEDLE, DIABETIC 30 GX3/16"
NEEDLE, DISPOSABLE MISCELLANEOUS
Qty: 300 EACH | Refills: 3 | Status: SHIPPED | OUTPATIENT
Start: 2025-01-20 | End: 2026-01-20

## 2025-01-20 NOTE — PROGRESS NOTES
"Subjective   Bob Aceves is a 79 y.o. male who presents for Diabetes follow up.    HPI   Monitoring glucose TID.  Fasting ave: 120-150  200 in evenings.  Denies adverse sx's r/t DM.  Taking meds as rx'd.    Review of Systems   Constitutional:  Negative for fatigue and fever.   Respiratory:  Negative for cough and shortness of breath.    Cardiovascular:  Negative for chest pain and leg swelling.   All other systems reviewed and are negative.      Health Maintenance Due   Topic Date Due    Diabetes: Retinopathy Screening  Never done    Hepatitis C Screening  Never done    Diabetes: Urine Protein Screening  05/18/2024    COVID-19 Vaccine (7 - 2024-25 season) 09/01/2024       Objective   /72   Pulse 68   Temp 36.7 °C (98.1 °F)   Resp 16   Ht 1.803 m (5' 11\")   Wt 81.6 kg (180 lb)   SpO2 98%   BMI 25.10 kg/m²     Physical Exam  Vitals and nursing note reviewed.   Constitutional:       Appearance: Normal appearance.   HENT:      Head: Normocephalic.   Eyes:      Conjunctiva/sclera: Conjunctivae normal.      Pupils: Pupils are equal, round, and reactive to light.   Cardiovascular:      Rate and Rhythm: Normal rate and regular rhythm.      Pulses: Normal pulses.      Heart sounds: Normal heart sounds.   Pulmonary:      Effort: Pulmonary effort is normal.      Breath sounds: Normal breath sounds.   Musculoskeletal:         General: No swelling.      Cervical back: Neck supple.   Skin:     General: Skin is warm and dry.   Neurological:      General: No focal deficit present.      Mental Status: He is oriented to person, place, and time.         Assessment/Plan   Problem List Items Addressed This Visit       CAD (coronary artery disease)    Diabetes mellitus (Multi) - Primary    Relevant Medications    metFORMIN (Glucophage) 1,000 mg tablet    pen needle, diabetic 31 gauge x 5/16\" needle    semaglutide 0.25 mg or 0.5 mg (2 mg/3 mL) pen injector    Other Relevant Orders    Albumin-Creatinine Ratio, Urine Random    " Lipid Panel    CBC    Comprehensive Metabolic Panel    Hemoglobin A1C    Vitamin B12    Hypertension    Hyperlipidemia    S/P CABG (coronary artery bypass graft)    Status post myocardial infarction    Acute exacerbation of chronic obstructive pulmonary disease (Multi)    Malignant neoplasm of prostate (Multi)     Cont meds  Trial of semaglutide in place of januvia  Reviewed labs  Discussed side effects  Stable based on symptoms and exam.  Continue established treatment plan and follow up at least yearly.

## 2025-01-21 ASSESSMENT — ENCOUNTER SYMPTOMS
FEVER: 0
SHORTNESS OF BREATH: 0
COUGH: 0
FATIGUE: 0

## 2025-01-27 ENCOUNTER — TELEPHONE (OUTPATIENT)
Dept: PRIMARY CARE | Facility: CLINIC | Age: 80
End: 2025-01-27
Payer: MEDICARE

## 2025-01-27 NOTE — TELEPHONE ENCOUNTER
Spoke with patient scheduled him for tomorrow at 130, however inquiring if there is something he should do prior to seeing you?

## 2025-01-27 NOTE — TELEPHONE ENCOUNTER
Pt called , he thinks he has impetigo ,  He wants to know  if he should go next door or if Dr Beltrán will see him?

## 2025-01-28 ENCOUNTER — OFFICE VISIT (OUTPATIENT)
Dept: PRIMARY CARE | Facility: CLINIC | Age: 80
End: 2025-01-28
Payer: MEDICARE

## 2025-01-28 VITALS
HEART RATE: 96 BPM | TEMPERATURE: 97.9 F | OXYGEN SATURATION: 97 % | BODY MASS INDEX: 25.2 KG/M2 | HEIGHT: 71 IN | SYSTOLIC BLOOD PRESSURE: 120 MMHG | DIASTOLIC BLOOD PRESSURE: 72 MMHG | RESPIRATION RATE: 16 BRPM | WEIGHT: 180 LBS

## 2025-01-28 DIAGNOSIS — E11.9 DM TYPE 2 WITHOUT RETINOPATHY (MULTI): ICD-10-CM

## 2025-01-28 DIAGNOSIS — L01.00 IMPETIGO: Primary | ICD-10-CM

## 2025-01-28 PROCEDURE — 1159F MED LIST DOCD IN RCRD: CPT | Performed by: INTERNAL MEDICINE

## 2025-01-28 PROCEDURE — 1123F ACP DISCUSS/DSCN MKR DOCD: CPT | Performed by: INTERNAL MEDICINE

## 2025-01-28 PROCEDURE — 1160F RVW MEDS BY RX/DR IN RCRD: CPT | Performed by: INTERNAL MEDICINE

## 2025-01-28 PROCEDURE — G2211 COMPLEX E/M VISIT ADD ON: HCPCS | Performed by: INTERNAL MEDICINE

## 2025-01-28 PROCEDURE — 3078F DIAST BP <80 MM HG: CPT | Performed by: INTERNAL MEDICINE

## 2025-01-28 PROCEDURE — 99213 OFFICE O/P EST LOW 20 MIN: CPT | Performed by: INTERNAL MEDICINE

## 2025-01-28 PROCEDURE — 3074F SYST BP LT 130 MM HG: CPT | Performed by: INTERNAL MEDICINE

## 2025-01-28 RX ORDER — MUPIROCIN 20 MG/G
OINTMENT TOPICAL 3 TIMES DAILY
Qty: 22 G | Refills: 0 | Status: SHIPPED | OUTPATIENT
Start: 2025-01-28 | End: 2025-02-07

## 2025-01-28 RX ORDER — DOXYCYCLINE 100 MG/1
100 CAPSULE ORAL 2 TIMES DAILY
Qty: 20 CAPSULE | Refills: 0 | Status: SHIPPED | OUTPATIENT
Start: 2025-01-28 | End: 2025-02-07

## 2025-01-28 ASSESSMENT — PATIENT HEALTH QUESTIONNAIRE - PHQ9
1. LITTLE INTEREST OR PLEASURE IN DOING THINGS: NOT AT ALL
SUM OF ALL RESPONSES TO PHQ9 QUESTIONS 1 AND 2: 0
2. FEELING DOWN, DEPRESSED OR HOPELESS: NOT AT ALL

## 2025-01-28 NOTE — PROGRESS NOTES
"Subjective   Bob Aceves is a 79 y.o. male who presents for Skin concern.    HPI   Pt c/o skin concern below L nares.  Skin reddened. Reports purulent drainage.  Denies pain, itching.  Tx: JIGAR and warm compresses.   States area is improving.    Review of Systems   Constitutional:  Negative for fatigue and fever.   Respiratory:  Negative for cough and shortness of breath.    Cardiovascular:  Negative for chest pain and leg swelling.   All other systems reviewed and are negative.      Health Maintenance Due   Topic Date Due    Diabetes: Retinopathy Screening  Never done    Hepatitis C Screening  Never done    Diabetes: Urine Protein Screening  05/18/2024    COVID-19 Vaccine (7 - 2024-25 season) 09/01/2024       Objective   /72   Pulse 96   Temp 36.6 °C (97.9 °F)   Resp 16   Ht 1.803 m (5' 11\")   Wt 81.6 kg (180 lb)   SpO2 97%   BMI 25.10 kg/m²     Physical Exam  Vitals and nursing note reviewed.   Constitutional:       Appearance: Normal appearance.   HENT:      Head: Normocephalic.   Eyes:      Conjunctiva/sclera: Conjunctivae normal.      Pupils: Pupils are equal, round, and reactive to light.   Cardiovascular:      Rate and Rhythm: Normal rate and regular rhythm.      Pulses: Normal pulses.      Heart sounds: Normal heart sounds.   Pulmonary:      Effort: Pulmonary effort is normal.      Breath sounds: Normal breath sounds.   Musculoskeletal:         General: No swelling.      Cervical back: Neck supple.   Skin:     General: Skin is warm and dry.   Neurological:      General: No focal deficit present.      Mental Status: He is oriented to person, place, and time.         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

## 2025-01-29 ASSESSMENT — ENCOUNTER SYMPTOMS
FEVER: 0
FATIGUE: 0
SHORTNESS OF BREATH: 0
COUGH: 0

## 2025-01-30 ENCOUNTER — TELEPHONE (OUTPATIENT)
Dept: PRIMARY CARE | Facility: CLINIC | Age: 80
End: 2025-01-30
Payer: MEDICARE

## 2025-01-30 NOTE — TELEPHONE ENCOUNTER
"Pt called w/update.  Area is still a little red, but has \"gone down, not open, pustule noted.  Taking ATB and using ATB ointment as Rx'd.  Encouraged pt to continue course of tx and call back w/any worsening sx's.    "

## 2025-01-31 ENCOUNTER — APPOINTMENT (OUTPATIENT)
Dept: CARDIOLOGY | Facility: CLINIC | Age: 80
End: 2025-01-31
Payer: MEDICARE

## 2025-01-31 VITALS
DIASTOLIC BLOOD PRESSURE: 70 MMHG | HEIGHT: 71 IN | WEIGHT: 178 LBS | SYSTOLIC BLOOD PRESSURE: 122 MMHG | HEART RATE: 72 BPM | BODY MASS INDEX: 24.92 KG/M2

## 2025-01-31 DIAGNOSIS — Z95.1 S/P CABG (CORONARY ARTERY BYPASS GRAFT): Primary | ICD-10-CM

## 2025-01-31 DIAGNOSIS — I25.10 CORONARY ARTERY DISEASE INVOLVING NATIVE CORONARY ARTERY OF NATIVE HEART WITHOUT ANGINA PECTORIS: ICD-10-CM

## 2025-01-31 DIAGNOSIS — E78.49 OTHER HYPERLIPIDEMIA: ICD-10-CM

## 2025-01-31 DIAGNOSIS — I10 PRIMARY HYPERTENSION: ICD-10-CM

## 2025-01-31 PROCEDURE — 1123F ACP DISCUSS/DSCN MKR DOCD: CPT | Performed by: INTERNAL MEDICINE

## 2025-01-31 PROCEDURE — 1159F MED LIST DOCD IN RCRD: CPT | Performed by: INTERNAL MEDICINE

## 2025-01-31 PROCEDURE — 3078F DIAST BP <80 MM HG: CPT | Performed by: INTERNAL MEDICINE

## 2025-01-31 PROCEDURE — 1036F TOBACCO NON-USER: CPT | Performed by: INTERNAL MEDICINE

## 2025-01-31 PROCEDURE — 3074F SYST BP LT 130 MM HG: CPT | Performed by: INTERNAL MEDICINE

## 2025-01-31 PROCEDURE — 99214 OFFICE O/P EST MOD 30 MIN: CPT | Performed by: INTERNAL MEDICINE

## 2025-01-31 NOTE — PATIENT INSTRUCTIONS

## 2025-01-31 NOTE — PROGRESS NOTES
"Chief Complaint:   Please see below.     History Of Present Illness:    Bob Aceves is a 79 y.o. male presenting with CAD.    This 79-year-old man with hypertension, diabetes mellitus, hyperlipidemia, and a prior history of tobacco abuse is status post prior MI and CABG in 2014. He is functionally class I, and his stress test in September 2019 disclosed no ischemia at 7 METs of exercise.  His most recent echo disclosed an EF of 50-55%, with an RVSP of 21 mm in October 2020. He has mild ectasia of the ascending aorta measuring 3.8-3.9 cm by echo in October 2020.     The patient denies chest discomfort, dyspnea, palpitations, orthopnea, PND, syncope, and near syncope.       Last Recorded Vitals:  Vitals:    01/31/25 1100   BP: 122/70   BP Location: Left arm   Patient Position: Sitting   Pulse: 72   Weight: 80.7 kg (178 lb)   Height: 1.803 m (5' 11\")       Past Medical History:  He has a past medical history of Other nonthrombocytopenic purpura (08/20/2019), Personal history of malignant neoplasm of prostate (12/10/2020), Personal history of other diseases of the respiratory system, Personal history of other diseases of the respiratory system, and Personal history of other endocrine, nutritional and metabolic disease (12/18/2019).    Past Surgical History:  He has a past surgical history that includes Coronary artery bypass graft (10/20/2016); Cataract extraction (10/20/2016); Pilonidal cyst drainage (10/20/2016); and Appendectomy (10/20/2016).      Social History:  He reports that he quit smoking about 11 years ago. His smoking use included cigarettes. He has never used smokeless tobacco. He reports that he does not currently use alcohol. He reports that he does not use drugs.    Family History:  Family History   Problem Relation Name Age of Onset    Alzheimer's disease Mother      Heart attack Father      Diabetes Paternal Grandfather          type 2 DM        Allergies:  Sulfamethoxazole-trimethoprim    Outpatient " "Medications:  Current Outpatient Medications   Medication Instructions    albuterol 90 mcg/actuation inhaler USE 1 TO 2 INHALATIONS BY MOUTH  EVERY 4 TO 6 HOURS AS NEEDED    ascorbic acid (Vitamin C) 1,000 mg tablet 1 tablet, Daily    aspirin 81 mg EC tablet 1 tablet, Daily    atorvastatin (LIPITOR) 10 mg, oral, Daily, as directed    blood sugar diagnostic (OneTouch Verio test strips) strip USE TO MONITOR GLUCOSE 3 TIMES  DAILY    blood-glucose meter misc Use to monitor glucose.    cholecalciferol (Vitamin D-3) 25 MCG (1000 UT) tablet 1 tablet, Daily    cyanocobalamin (Vitamin B-12) 1,000 mcg tablet 1 tablet, Daily    doxycycline (VIBRAMYCIN) 100 mg, oral, 2 times daily, Take with at least 8 ounces (large glass) of water, do not lie down for 30 minutes after    empagliflozin (JARDIANCE) 10 mg, oral, Daily    fluticasone (Flonase) 50 mcg/actuation nasal spray 1 spray, Each Nostril, Daily    gabapentin (NEURONTIN) 100 mg, oral, 4 times daily    glimepiride (Amaryl) 1 mg tablet TAKE 1 AND 1/2 TABLETS BY MOUTH  BEFORE BREAKFAST AND 1 AND 1/2  TABLETS BY MOUTH BEFORE DINNER    lancets (OneTouch Delica Plus Lancet) 30 gauge misc CHECK BLOOD SUGAR 3 TIMES DAILY  AS DIRECTED    Lantus Solostar U-100 Insulin 15 Units, subcutaneous, Nightly, Take as directed per insulin instructions.    lisinopril 15 mg, oral, 2 times daily    metFORMIN (Glucophage) 1,000 mg tablet TAKE 1 TABLET BY MOUTH TWICE  DAILY    metoprolol tartrate (LOPRESSOR) 25 mg, oral, 2 times daily    multivitamin-min-iron-FA-vit K (Adults Multivitamin) 18 mg iron-400 mcg-25 mcg tablet 1 tablet, Daily    mupirocin (Bactroban) 2 % ointment Topical, 3 times daily, apply to affected area    pen needle, diabetic 31 gauge x 5/16\" needle Use to inject 1-4 times daily as directed.    semaglutide 0.25 mg, subcutaneous, Weekly       Physical Exam:  GENERAL:  pleasant 79 year-old  HEENT: No xanthelasma  NECK: Supple, no palpable adenopathy or thyromegaly  CHEST: Clear to " auscultation, respiratory effort unlabored  CARDIAC: RRR, normal S1 and S2, no audible murmur, rub, gallop, carotids are brisk, PMI is not displaced  ABD: Active bowel sounds, nontender, no organomegaly, no evidence of ascites  EXT: No clubbing, cyanosis, edema, or tenderness  NEURO: Awake, alert, appropriate, speech is fluent       Last Labs:  CBC -  Lab Results   Component Value Date    WBC 7.2 01/14/2025    HGB 14.2 01/14/2025    HCT 42.6 01/14/2025    MCV 96 01/14/2025     01/14/2025       CMP -  Lab Results   Component Value Date    CALCIUM 9.4 01/14/2025    PROT 6.9 01/14/2025    ALBUMIN 4.6 01/14/2025    AST 12 01/14/2025    ALT 13 01/14/2025    ALKPHOS 60 01/14/2025    BILITOT 0.5 01/14/2025       LIPID PANEL -   Lab Results   Component Value Date    CHOL 104 01/14/2025    TRIG 131 01/14/2025    HDL 32.2 01/14/2025    CHHDL 3.2 01/14/2025    LDLF 43 08/29/2023    VLDL 26 01/14/2025    NHDL 72 01/14/2025       RENAL FUNCTION PANEL -   Lab Results   Component Value Date    GLUCOSE 305 (H) 01/14/2025     01/14/2025    K 4.5 01/14/2025     01/14/2025    CO2 29 01/14/2025    ANIONGAP 11 01/14/2025    BUN 22 01/14/2025    CREATININE 1.15 01/14/2025    GFRMALE 64 08/29/2023    CALCIUM 9.4 01/14/2025    ALBUMIN 4.6 01/14/2025        Lab Results   Component Value Date    HGBA1C 8.5 (H) 01/14/2025         Lab review: I have Chemistry CMP:   Lab Results   Component Value Date    ALBUMIN 4.6 01/14/2025    CALCIUM 9.4 01/14/2025    CO2 29 01/14/2025    CREATININE 1.15 01/14/2025    GLUCOSE 305 (H) 01/14/2025    BILITOT 0.5 01/14/2025    PROT 6.9 01/14/2025    ALT 13 01/14/2025    AST 12 01/14/2025    ALKPHOS 60 01/14/2025   , Chemistry BMP   Lab Results   Component Value Date    GLUCOSE 305 (H) 01/14/2025    CALCIUM 9.4 01/14/2025    CO2 29 01/14/2025    CREATININE 1.15 01/14/2025   , CBC:  Lab Results   Component Value Date    WBC 7.2 01/14/2025    RBC 4.45 (L) 01/14/2025    HGB 14.2 01/14/2025    HCT  42.6 01/14/2025    MCV 96 01/14/2025    MCH 31.9 01/14/2025    MCHC 33.3 01/14/2025    RDW 13.3 01/14/2025    NRBC 0.0 01/14/2025   , and Lipids:   Lab Results   Component Value Date    CHOL 104 01/14/2025    HDL 32.2 01/14/2025    LDLCALC 46 01/14/2025    TRIG 131 01/14/2025       Assessment/Plan   Assessment & Plan  Coronary artery disease involving native coronary artery of native heart without angina pectoris    Orders:    Follow Up In Cardiology    Follow Up In Cardiology; Future    S/P CABG (coronary artery bypass graft)    Orders:    Follow Up In Cardiology; Future    Primary hypertension  HTN:  BP is well controlled.   We discussed sodium restriction, lifestyle modification, and the DASH diet.  I advised the patient to check BPs at home daily, and to contact me with an update in one month.    Orders:    Follow Up In Cardiology; Future    Other hyperlipidemia  Risk factor modification: educational materials were provided to the patient.     Orders:    Follow Up In Cardiology; Future          Joe Orourke MD

## 2025-01-31 NOTE — ASSESSMENT & PLAN NOTE
Risk factor modification: educational materials were provided to the patient.     Orders:    Follow Up In Cardiology; Future

## 2025-02-03 ENCOUNTER — TELEPHONE (OUTPATIENT)
Dept: PRIMARY CARE | Facility: CLINIC | Age: 80
End: 2025-02-03
Payer: MEDICARE

## 2025-02-06 DIAGNOSIS — L01.00 IMPETIGO: ICD-10-CM

## 2025-02-06 RX ORDER — DOXYCYCLINE 100 MG/1
100 CAPSULE ORAL 2 TIMES DAILY
Qty: 10 CAPSULE | Refills: 0 | Status: SHIPPED | OUTPATIENT
Start: 2025-02-06 | End: 2025-02-11

## 2025-02-13 DIAGNOSIS — E11.9 TYPE 2 DIABETES MELLITUS WITHOUT COMPLICATION, UNSPECIFIED WHETHER LONG TERM INSULIN USE (MULTI): ICD-10-CM

## 2025-02-14 DIAGNOSIS — J30.9 ALLERGIC RHINITIS, UNSPECIFIED SEASONALITY, UNSPECIFIED TRIGGER: ICD-10-CM

## 2025-02-14 DIAGNOSIS — E11.9 DM TYPE 2 WITHOUT RETINOPATHY (MULTI): ICD-10-CM

## 2025-02-14 DIAGNOSIS — Z79.4 TYPE 2 DIABETES MELLITUS WITHOUT COMPLICATION, WITH LONG-TERM CURRENT USE OF INSULIN (MULTI): ICD-10-CM

## 2025-02-14 DIAGNOSIS — E11.9 TYPE 2 DIABETES MELLITUS WITHOUT COMPLICATION, WITH LONG-TERM CURRENT USE OF INSULIN (MULTI): ICD-10-CM

## 2025-02-14 RX ORDER — METFORMIN HYDROCHLORIDE 1000 MG/1
TABLET ORAL
Qty: 180 TABLET | Refills: 3 | Status: SHIPPED | OUTPATIENT
Start: 2025-02-14

## 2025-02-14 RX ORDER — GLIMEPIRIDE 1 MG/1
TABLET ORAL
Qty: 270 TABLET | Refills: 3 | Status: SHIPPED | OUTPATIENT
Start: 2025-02-14

## 2025-02-14 RX ORDER — INSULIN GLARGINE 100 [IU]/ML
15 INJECTION, SOLUTION SUBCUTANEOUS NIGHTLY
Qty: 4.5 ML | Refills: 11 | Status: SHIPPED | OUTPATIENT
Start: 2025-02-14 | End: 2026-02-14

## 2025-02-14 RX ORDER — BLOOD-GLUCOSE CONTROL, NORMAL
EACH MISCELLANEOUS
Qty: 300 EACH | Refills: 2 | Status: SHIPPED | OUTPATIENT
Start: 2025-02-14

## 2025-02-14 RX ORDER — BLOOD-GLUCOSE METER
EACH MISCELLANEOUS
Qty: 300 STRIP | Refills: 2 | Status: SHIPPED | OUTPATIENT
Start: 2025-02-14

## 2025-02-14 RX ORDER — FLUTICASONE PROPIONATE 50 MCG
1 SPRAY, SUSPENSION (ML) NASAL DAILY
Qty: 32 G | Refills: 3 | Status: SHIPPED | OUTPATIENT
Start: 2025-02-14

## 2025-03-22 DIAGNOSIS — E78.49 OTHER HYPERLIPIDEMIA: ICD-10-CM

## 2025-03-22 DIAGNOSIS — I10 HYPERTENSION, UNSPECIFIED TYPE: ICD-10-CM

## 2025-03-24 RX ORDER — METOPROLOL TARTRATE 25 MG/1
25 TABLET, FILM COATED ORAL 2 TIMES DAILY
Qty: 200 TABLET | Refills: 2 | Status: SHIPPED | OUTPATIENT
Start: 2025-03-24

## 2025-03-24 RX ORDER — ATORVASTATIN CALCIUM 10 MG/1
10 TABLET, FILM COATED ORAL DAILY
Qty: 100 TABLET | Refills: 2 | Status: SHIPPED | OUTPATIENT
Start: 2025-03-24

## 2025-04-02 ENCOUNTER — TELEPHONE (OUTPATIENT)
Dept: PRIMARY CARE | Facility: CLINIC | Age: 80
End: 2025-04-02
Payer: MEDICARE

## 2025-04-02 NOTE — TELEPHONE ENCOUNTER
Spoke w/pt and spouse.  Reports diarrhea, increase in flatulence, abdominal discomfort since increase in Ozempic dose x2 weeks.  Appetite is good.  Taking fluids well.  Took Imodium today.  Please advise.

## 2025-04-02 NOTE — TELEPHONE ENCOUNTER
Pt spouse left vm reporting GI upset w/Ozempic. Worsening. Diarrhea today.  Gas.  Taking Gas-X.  Requesting recommendations.

## 2025-04-10 DIAGNOSIS — E11.9 DM TYPE 2 WITHOUT RETINOPATHY (MULTI): ICD-10-CM

## 2025-04-16 LAB
ALBUMIN SERPL-MCNC: 4.4 G/DL (ref 3.6–5.1)
ALBUMIN/CREAT UR: 58 MG/G CREAT
ALP SERPL-CCNC: 69 U/L (ref 35–144)
ALT SERPL-CCNC: 11 U/L (ref 9–46)
ANION GAP SERPL CALCULATED.4IONS-SCNC: 11 MMOL/L (CALC) (ref 7–17)
AST SERPL-CCNC: 10 U/L (ref 10–35)
BILIRUB SERPL-MCNC: 0.4 MG/DL (ref 0.2–1.2)
BUN SERPL-MCNC: 20 MG/DL (ref 7–25)
CALCIUM SERPL-MCNC: 9.4 MG/DL (ref 8.6–10.3)
CHLORIDE SERPL-SCNC: 101 MMOL/L (ref 98–110)
CHOLEST SERPL-MCNC: 97 MG/DL
CHOLEST/HDLC SERPL: 3 (CALC)
CO2 SERPL-SCNC: 26 MMOL/L (ref 20–32)
CREAT SERPL-MCNC: 1.02 MG/DL (ref 0.7–1.28)
CREAT UR-MCNC: 74 MG/DL (ref 20–320)
EGFRCR SERPLBLD CKD-EPI 2021: 75 ML/MIN/1.73M2
ERYTHROCYTE [DISTWIDTH] IN BLOOD BY AUTOMATED COUNT: 12.3 % (ref 11–15)
EST. AVERAGE GLUCOSE BLD GHB EST-MCNC: 235 MG/DL
EST. AVERAGE GLUCOSE BLD GHB EST-SCNC: 13 MMOL/L
GLUCOSE SERPL-MCNC: 250 MG/DL (ref 65–139)
HBA1C MFR BLD: 9.8 %
HCT VFR BLD AUTO: 41.5 % (ref 38.5–50)
HDLC SERPL-MCNC: 32 MG/DL
HGB BLD-MCNC: 14 G/DL (ref 13.2–17.1)
LDLC SERPL CALC-MCNC: 46 MG/DL (CALC)
MCH RBC QN AUTO: 32.1 PG (ref 27–33)
MCHC RBC AUTO-ENTMCNC: 33.7 G/DL (ref 32–36)
MCV RBC AUTO: 95.2 FL (ref 80–100)
MICROALBUMIN UR-MCNC: 4.3 MG/DL
NONHDLC SERPL-MCNC: 65 MG/DL (CALC)
PLATELET # BLD AUTO: 193 THOUSAND/UL (ref 140–400)
PMV BLD REES-ECKER: 11.5 FL (ref 7.5–12.5)
POTASSIUM SERPL-SCNC: 4.4 MMOL/L (ref 3.5–5.3)
PROT SERPL-MCNC: 6.7 G/DL (ref 6.1–8.1)
RBC # BLD AUTO: 4.36 MILLION/UL (ref 4.2–5.8)
SODIUM SERPL-SCNC: 138 MMOL/L (ref 135–146)
TRIGL SERPL-MCNC: 103 MG/DL
VIT B12 SERPL-MCNC: >2000 PG/ML (ref 200–1100)
WBC # BLD AUTO: 7.2 THOUSAND/UL (ref 3.8–10.8)

## 2025-04-17 ENCOUNTER — TELEPHONE (OUTPATIENT)
Dept: NEUROSURGERY | Facility: CLINIC | Age: 80
End: 2025-04-17
Payer: MEDICARE

## 2025-04-17 NOTE — TELEPHONE ENCOUNTER
----- Message from Pepper Acevedo sent at 4/16/2025 12:09 PM EDT -----  Regarding: FW:  9.8 Hba1c not a candidate for surgery  ----- Message -----  From: Khurram Sharp Results In  Sent: 4/16/2025  12:49 AM EDT  To: Pepper Acevedo MD

## 2025-04-22 ENCOUNTER — APPOINTMENT (OUTPATIENT)
Dept: PRIMARY CARE | Facility: CLINIC | Age: 80
End: 2025-04-22
Payer: MEDICARE

## 2025-04-22 VITALS
OXYGEN SATURATION: 97 % | WEIGHT: 178 LBS | SYSTOLIC BLOOD PRESSURE: 110 MMHG | HEIGHT: 71 IN | HEART RATE: 64 BPM | TEMPERATURE: 97.8 F | BODY MASS INDEX: 24.92 KG/M2 | RESPIRATION RATE: 16 BRPM | DIASTOLIC BLOOD PRESSURE: 68 MMHG

## 2025-04-22 DIAGNOSIS — I25.118 CORONARY ARTERY DISEASE OF NATIVE ARTERY OF NATIVE HEART WITH STABLE ANGINA PECTORIS: ICD-10-CM

## 2025-04-22 DIAGNOSIS — Z95.1 S/P CABG (CORONARY ARTERY BYPASS GRAFT): ICD-10-CM

## 2025-04-22 DIAGNOSIS — E11.9 TYPE 2 DIABETES MELLITUS WITHOUT COMPLICATION, UNSPECIFIED WHETHER LONG TERM INSULIN USE: ICD-10-CM

## 2025-04-22 DIAGNOSIS — E11.9 DM TYPE 2 WITHOUT RETINOPATHY (MULTI): ICD-10-CM

## 2025-04-22 DIAGNOSIS — Z00.00 HEALTHCARE MAINTENANCE: Primary | ICD-10-CM

## 2025-04-22 DIAGNOSIS — J44.1 ACUTE EXACERBATION OF CHRONIC OBSTRUCTIVE PULMONARY DISEASE (MULTI): ICD-10-CM

## 2025-04-22 DIAGNOSIS — E55.9 VITAMIN D DEFICIENCY: ICD-10-CM

## 2025-04-22 DIAGNOSIS — E53.8 VITAMIN B12 DEFICIENCY: ICD-10-CM

## 2025-04-22 DIAGNOSIS — M54.16 LUMBAR RADICULOPATHY: ICD-10-CM

## 2025-04-22 DIAGNOSIS — J30.5 ALLERGIC RHINITIS DUE TO FOOD: ICD-10-CM

## 2025-04-22 DIAGNOSIS — C61 MALIGNANT NEOPLASM OF PROSTATE (MULTI): ICD-10-CM

## 2025-04-22 DIAGNOSIS — I10 PRIMARY HYPERTENSION: ICD-10-CM

## 2025-04-22 DIAGNOSIS — E78.2 MIXED HYPERLIPIDEMIA: ICD-10-CM

## 2025-04-22 PROCEDURE — 1170F FXNL STATUS ASSESSED: CPT | Performed by: INTERNAL MEDICINE

## 2025-04-22 PROCEDURE — 1158F ADVNC CARE PLAN TLK DOCD: CPT | Performed by: INTERNAL MEDICINE

## 2025-04-22 PROCEDURE — 3074F SYST BP LT 130 MM HG: CPT | Performed by: INTERNAL MEDICINE

## 2025-04-22 PROCEDURE — 1123F ACP DISCUSS/DSCN MKR DOCD: CPT | Performed by: INTERNAL MEDICINE

## 2025-04-22 PROCEDURE — 1036F TOBACCO NON-USER: CPT | Performed by: INTERNAL MEDICINE

## 2025-04-22 PROCEDURE — 1160F RVW MEDS BY RX/DR IN RCRD: CPT | Performed by: INTERNAL MEDICINE

## 2025-04-22 PROCEDURE — 99214 OFFICE O/P EST MOD 30 MIN: CPT | Performed by: INTERNAL MEDICINE

## 2025-04-22 PROCEDURE — 3078F DIAST BP <80 MM HG: CPT | Performed by: INTERNAL MEDICINE

## 2025-04-22 PROCEDURE — G0439 PPPS, SUBSEQ VISIT: HCPCS | Performed by: INTERNAL MEDICINE

## 2025-04-22 PROCEDURE — 1159F MED LIST DOCD IN RCRD: CPT | Performed by: INTERNAL MEDICINE

## 2025-04-22 RX ORDER — ALBUTEROL SULFATE 90 UG/1
INHALANT RESPIRATORY (INHALATION)
Qty: 34 G | Refills: 4 | Status: SHIPPED | OUTPATIENT
Start: 2025-04-22

## 2025-04-22 ASSESSMENT — ENCOUNTER SYMPTOMS
FEVER: 0
SHORTNESS OF BREATH: 0
COUGH: 0
LOSS OF SENSATION IN FEET: 1
FATIGUE: 0
OCCASIONAL FEELINGS OF UNSTEADINESS: 0
DEPRESSION: 0

## 2025-04-22 ASSESSMENT — ACTIVITIES OF DAILY LIVING (ADL)
GROCERY_SHOPPING: INDEPENDENT
DRESSING: INDEPENDENT
TAKING_MEDICATION: INDEPENDENT
DOING_HOUSEWORK: INDEPENDENT
MANAGING_FINANCES: INDEPENDENT
BATHING: INDEPENDENT

## 2025-04-22 NOTE — ASSESSMENT & PLAN NOTE
Orders:    albuterol 90 mcg/actuation inhaler; USE 1 TO 2 INHALATIONS BY  MOUTH EVERY 4 TO 6 HOURS AS NEEDED

## 2025-04-22 NOTE — ASSESSMENT & PLAN NOTE
"  Orders:    pen needle, diabetic 32 gauge x 1/6\" needle; ETHAN w/Ozempic.    Referral to Clinical Pharmacy; Future    "

## 2025-04-22 NOTE — PROGRESS NOTES
"Subjective   Reason for Visit: Bob Aceves is an 80 y.o. male here for a Medicare Wellness visit.     Past Medical, Surgical, and Family History reviewed and updated in chart.    Reviewed all medications by prescribing practitioner or clinical pharmacist (such as prescriptions, OTCs, herbal therapies and supplements) and documented in the medical record.    HPI  Influenza 2024  Covid 2021, 2022, 2023  PCV13 2018  PPSV23 2014  Shingrix 2020, 2021  Tdap 2016  Cologuard 2022  Adv Dir yes  Bmi 24  Fall risk 25   Eye exam 25  Depression screen 25  Psa kontak yearly    Hypoglycemic  Ws pale 39    Patient Care Team:  Lina Beltrán DO as PCP - General  Lina Beltrán DO as PCP - [employer] PCP  Pepper Acevedo MD as Surgeon (Neurosurgery)  Joe Orourke MD as Consulting Physician (Cardiology)     Review of Systems   Constitutional:  Negative for fatigue and fever.   Respiratory:  Negative for cough and shortness of breath.    Cardiovascular:  Negative for chest pain and leg swelling.   All other systems reviewed and are negative.      Objective   Vitals:  /68   Pulse 64   Temp 36.6 °C (97.8 °F)   Resp 16   Ht 1.803 m (5' 11\")   Wt 80.7 kg (178 lb)   SpO2 97%   BMI 24.83 kg/m²       Physical Exam  Vitals and nursing note reviewed.   Constitutional:       Appearance: Normal appearance.   HENT:      Head: Normocephalic.   Eyes:      Conjunctiva/sclera: Conjunctivae normal.      Pupils: Pupils are equal, round, and reactive to light.   Cardiovascular:      Rate and Rhythm: Normal rate and regular rhythm.      Pulses: Normal pulses.      Heart sounds: Normal heart sounds.   Pulmonary:      Effort: Pulmonary effort is normal.      Breath sounds: Normal breath sounds.   Musculoskeletal:         General: No swelling.      Cervical back: Neck supple.   Skin:     General: Skin is warm and dry.   Neurological:      General: No focal deficit present.      Mental Status: He is oriented to person, " "place, and time.         Assessment & Plan  Allergic rhinitis due to food    Orders:    albuterol 90 mcg/actuation inhaler; USE 1 TO 2 INHALATIONS BY  MOUTH EVERY 4 TO 6 HOURS AS NEEDED    DM type 2 without retinopathy (Multi)    Orders:    pen needle, diabetic 32 gauge x 1/6\" needle; UAD w/Ozempic.    Referral to Clinical Pharmacy; Future    Healthcare maintenance         Coronary artery disease of native artery of native heart with stable angina pectoris         Mixed hyperlipidemia    Orders:    Thyroid Stimulating Hormone; Future    Primary hypertension         S/P CABG (coronary artery bypass graft)         Malignant neoplasm of prostate (Multi)         Lumbar radiculopathy         Acute exacerbation of chronic obstructive pulmonary disease (Multi)         Vitamin D deficiency    Orders:    Vitamin D 25-Hydroxy,Total (for eval of Vitamin D levels); Future    Vitamin B12 deficiency    Orders:    Vitamin B12; Future    Type 2 diabetes mellitus without complication, unspecified whether long term insulin use    Orders:    Lipid Panel; Future    CBC; Future    Comprehensive Metabolic Panel; Future    Hemoglobin A1C; Future     Update preventive  Cont meds  Check labs  Stable based on symptoms and exam.  Continue established treatment plan and follow up at least yearly.  Will refer to pharmacy to get new cgm and report glucose in 2 weeks to adjust insulin         "

## 2025-05-05 ENCOUNTER — APPOINTMENT (OUTPATIENT)
Dept: PHARMACY | Facility: HOSPITAL | Age: 80
End: 2025-05-05
Payer: MEDICARE

## 2025-05-05 DIAGNOSIS — E11.9 DM TYPE 2 WITHOUT RETINOPATHY (MULTI): ICD-10-CM

## 2025-05-05 RX ORDER — BLOOD-GLUCOSE SENSOR
EACH MISCELLANEOUS
Qty: 6 EACH | Refills: 2 | Status: SHIPPED | OUTPATIENT
Start: 2025-05-05 | End: 2025-05-08 | Stop reason: SDUPTHER

## 2025-05-05 NOTE — PROGRESS NOTES
Patient ID: Bob Aceves is a 80 y.o. male who presents for Diabetes management. This is an initial visit.    Patient was referred to clinical pharmacy with a plan to initiate a continuous glucose monitor.    Referring provider: Lina Beltrán DO   Subjective       Diabetes Assessment    Diet: 3 meals per day  Breakfast: yogurt, fruit, toast  Lunch: sandwich, orange, grapes, chips  Dinner: chicken, salmon    Exercise: Notable less active in the previous months. Discussed ADA recommendation of 150 minutes per week of moderate-intensity exercise     Weight: 173 lbs    Allergies[1]    Current DM pharmacotherapy:   Insulin glargine 15 units daily at bedtime  Glimepiride 1.5 mg twice daily with meals  Metformin 1000 mg twice daily  Ozempic 0.25 mg once weekly  Jardiance 10 mg once daily    Secondary prevention:  Statin? Yes - atorvastatin 10 mg daily  ACE-I/ARB? Yes - lisinopril 10 mg daily  Aspirin? Yes - ASA EC 81 mg daily    Current monitoring regimen:   Patient is using: glucometer    Testing frequency: daily fasting + occasional random/post-prandial    Fastin mg/dL  132 mg/dL  123 mg/dL  124 mg/dL  121 mg/dL    Post-prandial  224 mg/dL  215 mg/dL    Any episodes of hypoglycemia? No  Hypoglycemia awareness? Yes    Pertinent co-morbidities:  Chronic Kidney Disease: No  Liver Disease: No  Heart Failure: No  Cardiovascular Disease: Yes - CAD  Previous MACE: Yes - prior MI & CABG 2014    Pertinent PMH review:  PMH of Pancreatitis: No  PMH/FH of Medullary Thyroid Cancer: No  PMH/FH of Multiple Endocrine Neoplasia (MEN) Type II: No  PMH of Retinopathy: No  PMH of Urinary Tract Infections/Yeast Infections: No    Patient goals:  Fasting B - 130 mg/dL  Postprandial BG: less than 180 mg/dL  A1c less than 7%    Drug Interactions:  No significant drug-drug interactions exist that require adjustment to therapy    Medication Concerns:  No issues reported in regards to accessibility, affordability, adherence,  "adverse effects, or organization.      Medication Review  Current Outpatient Medications   Medication Instructions    albuterol 90 mcg/actuation inhaler USE 1 TO 2 INHALATIONS BY  MOUTH EVERY 4 TO 6 HOURS AS NEEDED    ascorbic acid (Vitamin C) 1,000 mg tablet 1 tablet, Daily    aspirin 81 mg EC tablet 1 tablet, Daily    atorvastatin (LIPITOR) 10 mg, oral, Daily, as directed    blood sugar diagnostic (OneTouch Verio test strips) strip USE TO MONITOR GLUCOSE 3 TIMES  DAILY    blood-glucose meter misc Use to monitor glucose.    blood-glucose sensor (FreeStyle Petr 3 Plus Sensor) device Use to monitor blood sugar. Replace sensor every 15 days as directed.    cholecalciferol (Vitamin D-3) 25 MCG (1000 UT) tablet 1 tablet, Daily    cyanocobalamin (Vitamin B-12) 1,000 mcg tablet 1 tablet, Daily    empagliflozin (JARDIANCE) 10 mg, oral, Daily    fluticasone (Flonase) 50 mcg/actuation nasal spray 1 spray, Each Nostril, Daily    gabapentin (NEURONTIN) 100 mg, oral, 4 times daily    glimepiride (Amaryl) 1 mg tablet TAKE 1 AND 1/2 TABLETS BY MOUTH  BEFORE BREAKFAST AND 1 AND 1/2  TABLETS BY MOUTH BEFORE DINNER    lancets (OneTouch Delica Plus Lancet) 30 gauge misc CHECK BLOOD SUGAR 3 TIMES DAILY  AS DIRECTED    Lantus Solostar U-100 Insulin 15 Units, subcutaneous, Nightly, Take as directed per insulin instructions.    lisinopril 15 mg, oral, 2 times daily    metFORMIN (Glucophage) 1,000 mg tablet TAKE 1 TABLET BY MOUTH TWICE  DAILY    metoprolol tartrate (LOPRESSOR) 25 mg, oral, 2 times daily    multivitamin-min-iron-FA-vit K (Adults Multivitamin) 18 mg iron-400 mcg-25 mcg tablet 1 tablet, Daily    pen needle, diabetic 31 gauge x 5/16\" needle Use to inject 1-4 times daily as directed.    pen needle, diabetic 32 gauge x 1/6\" needle UAD w/Ozempic.    semaglutide 0.5 mg, subcutaneous, Weekly        Objective   Lab Review  Lab Results   Component Value Date    HGBA1C 9.8 (H) 04/15/2025    HGBA1C 8.5 (H) 01/14/2025    HGBA1C 8.7 " "(H) 10/07/2024    HGBA1C 7.6 (H) 06/06/2024    HGBA1C 8.2 (H) 02/28/2024    HGBA1C 8.0 (H) 11/30/2023    HGBA1C 9.7 (A) 08/29/2023    HGBA1C 9.5 (A) 05/18/2023    HGBA1C 7.4 03/09/2020    HGBA1C 6.3 03/27/2019      Lab Results   Component Value Date    EGFR 75 04/15/2025    EGFR 66 01/14/2025    EGFR 69 10/07/2024    EGFR 70 06/06/2024    EGFR 64 02/28/2024    EGFR 61 11/30/2023     No components found for: \"UACR\"  Lab Results   Component Value Date    TRIG 103 04/15/2025    CHOL 97 04/15/2025    LDLCALC 46 04/15/2025    HDL 32 (L) 04/15/2025     Lab Results   Component Value Date    BILITOT 0.4 04/15/2025    CALCIUM 9.4 04/15/2025    CO2 26 04/15/2025     04/15/2025    CREATININE 1.02 04/15/2025    GLUCOSE 250 (H) 04/15/2025    ALKPHOS 69 04/15/2025    K 4.4 04/15/2025    PROT 6.7 04/15/2025     04/15/2025    AST 10 04/15/2025    ALT 11 04/15/2025    BUN 20 04/15/2025    ANIONGAP 11 04/15/2025    ALBUMIN 4.4 04/15/2025     BP Readings from Last 3 Encounters:   04/22/25 110/68   01/31/25 122/70   01/28/25 120/72     BMI Readings from Last 1 Encounters:   04/22/25 24.83 kg/m²     The ASCVD Risk score (Eden OLMEDO, et al., 2019) failed to calculate for the following reasons:    Risk score cannot be calculated because patient has a medical history suggesting prior/existing ASCVD    PATIENT EDUCATION/GOALS  Counseled patient on MOA, expectations, side effects, duration of therapy, contraindications, administration, and monitoring parameters  Answered all patient questions and concerns; provided [facility] phone number if issues/questions arise    Assessment/Plan   Problem List Items Addressed This Visit       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   Current A1C: 9.8% (4/15/25)  Goal A1C: <7%    Discussion: Bob is doing well at " this time. Reported blood glucose values at goal for fasting / elevated for post-prandial. Denies any adverse effects to current regimen. No concerns with Ozempic following dose-decrease back to 0.25 mg weekly. Patient is agreeable to start Freestyle Petr 3 Plus - discussed in depth with Bob and his wife. They would like to meet in-person to complete set-up with the device / phone application if they are unable to figure it out themselves. No changes to regimen at this time. Patient to follow-up with Dr. Beltrán 5/14. Patient to contact this pharmacist with any questions or issues.   Plan:   Initiate:  Freestyle Petr 3 Plus  Continue:  Insulin glargine 15 units daily at bedtime  Glimepiride 1.5 mg twice daily with meals  Metformin 1000 mg twice daily  Ozempic 0.25 mg once weekly  Jardiance 10 mg once daily  Follow-up: 1 weeks; 6/13 @ 11 am  Future considerations: in-person Freestyle-setup / follow-up if they were able to set it up  Refills: Not needed    Kyle Fierro, PharmD  PGY-1 Pharmacy Resident         [1]   Allergies  Allergen Reactions    Sulfamethoxazole-Trimethoprim Hives

## 2025-05-05 NOTE — Clinical Note
Hi, Dr. Beltrán!  Bob is planning to start on the Petr 3 this upcoming week - he & Alanis were unsure if they would be able to set it up without assistance so they're going to meet me in [facility] on 5/13 - I apologize as this means there might not be enough data when you see him on 5/14! Thank you!

## 2025-05-06 NOTE — PROGRESS NOTES
I reviewed the progress note and agree with the resident’s findings and plans as written. Case discussed with resident.    Charles Baum, PharmD

## 2025-05-07 ENCOUNTER — APPOINTMENT (OUTPATIENT)
Dept: PRIMARY CARE | Facility: CLINIC | Age: 80
End: 2025-05-07
Payer: MEDICARE

## 2025-05-08 PROCEDURE — RXMED WILLOW AMBULATORY MEDICATION CHARGE

## 2025-05-08 RX ORDER — BLOOD-GLUCOSE SENSOR
EACH MISCELLANEOUS
Qty: 6 EACH | Refills: 2 | Status: SHIPPED | OUTPATIENT
Start: 2025-05-08

## 2025-05-10 ENCOUNTER — PHARMACY VISIT (OUTPATIENT)
Dept: PHARMACY | Facility: CLINIC | Age: 80
End: 2025-05-10
Payer: COMMERCIAL

## 2025-05-13 ENCOUNTER — APPOINTMENT (OUTPATIENT)
Dept: PHARMACY | Facility: HOSPITAL | Age: 80
End: 2025-05-13
Payer: MEDICARE

## 2025-05-14 ENCOUNTER — APPOINTMENT (OUTPATIENT)
Dept: PRIMARY CARE | Facility: CLINIC | Age: 80
End: 2025-05-14
Payer: MEDICARE

## 2025-05-15 ENCOUNTER — OFFICE VISIT (OUTPATIENT)
Dept: PRIMARY CARE | Facility: CLINIC | Age: 80
End: 2025-05-15
Payer: MEDICARE

## 2025-05-15 VITALS
HEART RATE: 85 BPM | DIASTOLIC BLOOD PRESSURE: 72 MMHG | WEIGHT: 171 LBS | OXYGEN SATURATION: 96 % | TEMPERATURE: 100.3 F | RESPIRATION RATE: 18 BRPM | SYSTOLIC BLOOD PRESSURE: 126 MMHG | BODY MASS INDEX: 23.85 KG/M2

## 2025-05-15 DIAGNOSIS — R05.1 ACUTE COUGH: ICD-10-CM

## 2025-05-15 LAB
POC RAPID INFLUENZA A: NEGATIVE
POC RAPID INFLUENZA B: NEGATIVE
POC SARS-COV-2 AG BINAX: NORMAL

## 2025-05-15 PROCEDURE — 1160F RVW MEDS BY RX/DR IN RCRD: CPT | Performed by: FAMILY MEDICINE

## 2025-05-15 PROCEDURE — 1159F MED LIST DOCD IN RCRD: CPT | Performed by: FAMILY MEDICINE

## 2025-05-15 PROCEDURE — 87811 SARS-COV-2 COVID19 W/OPTIC: CPT | Performed by: FAMILY MEDICINE

## 2025-05-15 PROCEDURE — 87804 INFLUENZA ASSAY W/OPTIC: CPT | Performed by: FAMILY MEDICINE

## 2025-05-15 PROCEDURE — 1036F TOBACCO NON-USER: CPT | Performed by: FAMILY MEDICINE

## 2025-05-15 PROCEDURE — 3078F DIAST BP <80 MM HG: CPT | Performed by: FAMILY MEDICINE

## 2025-05-15 PROCEDURE — 99214 OFFICE O/P EST MOD 30 MIN: CPT | Performed by: FAMILY MEDICINE

## 2025-05-15 PROCEDURE — 3074F SYST BP LT 130 MM HG: CPT | Performed by: FAMILY MEDICINE

## 2025-05-15 RX ORDER — AZITHROMYCIN 250 MG/1
TABLET, FILM COATED ORAL
Qty: 6 TABLET | Refills: 0 | Status: SHIPPED | OUTPATIENT
Start: 2025-05-15 | End: 2025-05-20

## 2025-05-15 RX ORDER — BENZONATATE 100 MG/1
100 CAPSULE ORAL 3 TIMES DAILY PRN
Qty: 42 CAPSULE | Refills: 0 | Status: SHIPPED | OUTPATIENT
Start: 2025-05-15 | End: 2025-06-14

## 2025-05-15 ASSESSMENT — ENCOUNTER SYMPTOMS
HEADACHES: 1
HEMATURIA: 0
COUGH: 1
SHORTNESS OF BREATH: 0
VOMITING: 0
FEVER: 1
MYALGIAS: 0
DIARRHEA: 0
FATIGUE: 1
NAUSEA: 0
WHEEZING: 1
RHINORRHEA: 1
SORE THROAT: 0
DIFFICULTY URINATING: 0

## 2025-05-15 NOTE — ASSESSMENT & PLAN NOTE
Advised pt rft and covid were negative  Pt declined pcr testing  Advised pt to take meds as directed  Rest and increase flds  F/up if no improvement

## 2025-05-15 NOTE — PROGRESS NOTES
Subjective   Patient ID: Bob Aceves is a 80 y.o. male who presents for Cough.    Cough  The current episode started yesterday. The cough is Non-productive. Associated symptoms include a fever, headaches, rhinorrhea and wheezing. Pertinent negatives include no chest pain, ear pain, myalgias, sore throat or shortness of breath.        Review of Systems   Constitutional:  Positive for fatigue and fever.        Pt had been with wife who was in the hospital  Developed a cough and now with Elevated temp today     HENT:  Positive for rhinorrhea. Negative for congestion, ear discharge, ear pain and sore throat.    Respiratory:  Positive for cough and wheezing. Negative for shortness of breath.         Pt with copd, using inhaler   Cardiovascular:  Negative for chest pain.   Gastrointestinal:  Negative for diarrhea, nausea and vomiting.   Genitourinary:  Negative for difficulty urinating and hematuria.   Musculoskeletal:  Negative for myalgias.   Neurological:  Positive for headaches.       Objective   /72   Pulse 85   Temp (!) 37.9 °C (100.3 °F)   Resp 18   Wt 77.6 kg (171 lb)   SpO2 96%   BMI 23.85 kg/m²     Physical Exam  Vitals and nursing note reviewed.   Constitutional:       General: He is not in acute distress.     Appearance: Normal appearance.   HENT:      Head: Normocephalic and atraumatic.      Right Ear: Tympanic membrane, ear canal and external ear normal.      Left Ear: Tympanic membrane, ear canal and external ear normal.      Nose: Nose normal.      Mouth/Throat:      Mouth: Mucous membranes are moist.      Pharynx: Oropharynx is clear.   Cardiovascular:      Rate and Rhythm: Normal rate and regular rhythm.      Heart sounds: Normal heart sounds.   Pulmonary:      Effort: Pulmonary effort is normal.      Breath sounds: Normal breath sounds.   Musculoskeletal:      Cervical back: Neck supple.   Lymphadenopathy:      Cervical: No cervical adenopathy.   Skin:     General: Skin is warm and dry.    Neurological:      Mental Status: He is alert.         Assessment/Plan   Problem List Items Addressed This Visit           ICD-10-CM    Acute cough R05.1    Advised pt rft and covid were negative  Pt declined pcr testing  Advised pt to take meds as directed  Rest and increase flds  F/up if no improvement         Relevant Medications    azithromycin (Zithromax) 250 mg tablet    benzonatate (Tessalon) 100 mg capsule    Other Relevant Orders    POCT Influenza A/B manually resulted (Completed)    POCT BinaxNOW Covid-19 Ag Card manually resulted (Completed)

## 2025-05-20 ENCOUNTER — APPOINTMENT (OUTPATIENT)
Dept: PHARMACY | Facility: HOSPITAL | Age: 80
End: 2025-05-20
Payer: MEDICARE

## 2025-05-28 DIAGNOSIS — I25.10 ATHEROSCLEROSIS OF NATIVE CORONARY ARTERY OF NATIVE HEART WITHOUT ANGINA PECTORIS: Primary | ICD-10-CM

## 2025-05-28 DIAGNOSIS — E78.49 OTHER HYPERLIPIDEMIA: ICD-10-CM

## 2025-05-28 DIAGNOSIS — I10 HYPERTENSION, UNSPECIFIED TYPE: ICD-10-CM

## 2025-05-28 NOTE — TELEPHONE ENCOUNTER
Patient requesting to refill Metoprolol and Atorvastatin to [facility]'s pharmacy on [address] in [place].

## 2025-05-31 RX ORDER — ATORVASTATIN CALCIUM 10 MG/1
10 TABLET, FILM COATED ORAL DAILY
Qty: 100 TABLET | Refills: 2 | Status: SHIPPED | OUTPATIENT
Start: 2025-05-31

## 2025-05-31 RX ORDER — METOPROLOL TARTRATE 25 MG/1
25 TABLET, FILM COATED ORAL 2 TIMES DAILY
Qty: 180 TABLET | Refills: 3 | Status: SHIPPED | OUTPATIENT
Start: 2025-05-31

## 2025-06-13 ENCOUNTER — APPOINTMENT (OUTPATIENT)
Dept: PHARMACY | Facility: HOSPITAL | Age: 80
End: 2025-06-13
Payer: MEDICARE

## 2025-06-20 LAB
25(OH)D3+25(OH)D2 SERPL-MCNC: 72 NG/ML (ref 30–100)
ALBUMIN SERPL-MCNC: 4.7 G/DL (ref 3.6–5.1)
ALP SERPL-CCNC: 59 U/L (ref 35–144)
ALT SERPL-CCNC: 13 U/L (ref 9–46)
ANION GAP SERPL CALCULATED.4IONS-SCNC: 9 MMOL/L (CALC) (ref 7–17)
AST SERPL-CCNC: 13 U/L (ref 10–35)
BILIRUB SERPL-MCNC: 0.5 MG/DL (ref 0.2–1.2)
BUN SERPL-MCNC: 22 MG/DL (ref 7–25)
CALCIUM SERPL-MCNC: 9.8 MG/DL (ref 8.6–10.3)
CHLORIDE SERPL-SCNC: 100 MMOL/L (ref 98–110)
CHOLEST SERPL-MCNC: 89 MG/DL
CHOLEST/HDLC SERPL: 3 (CALC)
CO2 SERPL-SCNC: 28 MMOL/L (ref 20–32)
CREAT SERPL-MCNC: 1.04 MG/DL (ref 0.7–1.28)
EGFRCR SERPLBLD CKD-EPI 2021: 73 ML/MIN/1.73M2
ERYTHROCYTE [DISTWIDTH] IN BLOOD BY AUTOMATED COUNT: 12.8 % (ref 11–15)
EST. AVERAGE GLUCOSE BLD GHB EST-MCNC: 183 MG/DL
EST. AVERAGE GLUCOSE BLD GHB EST-SCNC: 10.1 MMOL/L
GLUCOSE SERPL-MCNC: 149 MG/DL (ref 65–139)
HBA1C MFR BLD: 8 %
HCT VFR BLD AUTO: 41.8 % (ref 38.5–50)
HDLC SERPL-MCNC: 30 MG/DL
HGB BLD-MCNC: 13.9 G/DL (ref 13.2–17.1)
LDLC SERPL CALC-MCNC: 39 MG/DL (CALC)
MCH RBC QN AUTO: 31.7 PG (ref 27–33)
MCHC RBC AUTO-ENTMCNC: 33.3 G/DL (ref 32–36)
MCV RBC AUTO: 95.4 FL (ref 80–100)
NONHDLC SERPL-MCNC: 59 MG/DL (CALC)
PLATELET # BLD AUTO: 181 THOUSAND/UL (ref 140–400)
PMV BLD REES-ECKER: 10.7 FL (ref 7.5–12.5)
POTASSIUM SERPL-SCNC: 4.9 MMOL/L (ref 3.5–5.3)
PROT SERPL-MCNC: 7.1 G/DL (ref 6.1–8.1)
RBC # BLD AUTO: 4.38 MILLION/UL (ref 4.2–5.8)
SODIUM SERPL-SCNC: 137 MMOL/L (ref 135–146)
TRIGL SERPL-MCNC: 114 MG/DL
TSH SERPL-ACNC: 1.01 MIU/L (ref 0.4–4.5)
VIT B12 SERPL-MCNC: >2000 PG/ML (ref 200–1100)
WBC # BLD AUTO: 7.2 THOUSAND/UL (ref 3.8–10.8)

## 2025-06-23 ENCOUNTER — APPOINTMENT (OUTPATIENT)
Dept: PRIMARY CARE | Facility: CLINIC | Age: 80
End: 2025-06-23
Payer: MEDICARE

## 2025-06-23 VITALS
RESPIRATION RATE: 16 BRPM | OXYGEN SATURATION: 98 % | SYSTOLIC BLOOD PRESSURE: 122 MMHG | DIASTOLIC BLOOD PRESSURE: 62 MMHG | HEIGHT: 71 IN | TEMPERATURE: 97.8 F | HEART RATE: 68 BPM | BODY MASS INDEX: 24.08 KG/M2 | WEIGHT: 172 LBS

## 2025-06-23 DIAGNOSIS — H34.8310 TRIBUTARY (BRANCH) RETINAL VEIN OCCLUSION, RIGHT EYE, WITH MACULAR EDEMA: Primary | ICD-10-CM

## 2025-06-23 DIAGNOSIS — Z95.1 S/P CABG (CORONARY ARTERY BYPASS GRAFT): ICD-10-CM

## 2025-06-23 DIAGNOSIS — E78.2 MIXED HYPERLIPIDEMIA: ICD-10-CM

## 2025-06-23 DIAGNOSIS — R21 RASH: ICD-10-CM

## 2025-06-23 DIAGNOSIS — I10 PRIMARY HYPERTENSION: ICD-10-CM

## 2025-06-23 DIAGNOSIS — E11.9 TYPE 2 DIABETES MELLITUS WITHOUT COMPLICATION, UNSPECIFIED WHETHER LONG TERM INSULIN USE: ICD-10-CM

## 2025-06-23 DIAGNOSIS — E11.9 DM TYPE 2 WITHOUT RETINOPATHY (MULTI): ICD-10-CM

## 2025-06-23 PROBLEM — R05.1 ACUTE COUGH: Status: RESOLVED | Noted: 2025-05-15 | Resolved: 2025-06-23

## 2025-06-23 PROCEDURE — 3074F SYST BP LT 130 MM HG: CPT | Performed by: INTERNAL MEDICINE

## 2025-06-23 PROCEDURE — 1160F RVW MEDS BY RX/DR IN RCRD: CPT | Performed by: INTERNAL MEDICINE

## 2025-06-23 PROCEDURE — G2211 COMPLEX E/M VISIT ADD ON: HCPCS | Performed by: INTERNAL MEDICINE

## 2025-06-23 PROCEDURE — 1159F MED LIST DOCD IN RCRD: CPT | Performed by: INTERNAL MEDICINE

## 2025-06-23 PROCEDURE — 3078F DIAST BP <80 MM HG: CPT | Performed by: INTERNAL MEDICINE

## 2025-06-23 PROCEDURE — 99214 OFFICE O/P EST MOD 30 MIN: CPT | Performed by: INTERNAL MEDICINE

## 2025-06-23 NOTE — PROGRESS NOTES
"Subjective   Bob Aceves is a 80 y.o. male who presents for Diabetes follow up.    HPI   Monitoring glucose via CGM.  Fasting ave: 100-110.  Lowest 77.  Taking insulin at noon.  Denies adverse sx's r/t DM.  Taking meds as Rx'd.    Review of Systems   Constitutional:  Negative for fatigue and fever.   Respiratory:  Negative for cough and shortness of breath.    Cardiovascular:  Negative for chest pain and leg swelling.   All other systems reviewed and are negative.      Health Maintenance Due   Topic Date Due    Diabetes: Retinopathy Screening  Never done    Hepatitis C Screening  Never done    COVID-19 Vaccine (7 - 2024-25 season) 09/01/2024       Objective   /62   Pulse 68   Temp 36.6 °C (97.8 °F)   Resp 16   Ht 1.803 m (5' 11\")   Wt 78 kg (172 lb)   SpO2 98%   BMI 23.99 kg/m²     Physical Exam  Vitals and nursing note reviewed.   Constitutional:       Appearance: Normal appearance.   HENT:      Head: Normocephalic.   Eyes:      Conjunctiva/sclera: Conjunctivae normal.      Pupils: Pupils are equal, round, and reactive to light.   Cardiovascular:      Rate and Rhythm: Normal rate and regular rhythm.      Pulses: Normal pulses.      Heart sounds: Normal heart sounds.   Pulmonary:      Effort: Pulmonary effort is normal.      Breath sounds: Normal breath sounds.   Musculoskeletal:         General: No swelling.      Cervical back: Neck supple.   Skin:     General: Skin is warm and dry.   Neurological:      General: No focal deficit present.      Mental Status: He is oriented to person, place, and time.         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  Continue established treatment plan and follow up at least yearly.  Refer to derm for hand condition

## 2025-06-24 ASSESSMENT — ENCOUNTER SYMPTOMS
SHORTNESS OF BREATH: 0
FATIGUE: 0
COUGH: 0
FEVER: 0

## 2025-07-08 ENCOUNTER — TELEPHONE (OUTPATIENT)
Dept: NEUROSURGERY | Facility: CLINIC | Age: 80
End: 2025-07-08
Payer: MEDICARE

## 2025-07-08 NOTE — TELEPHONE ENCOUNTER
----- Message from Pepper Acevedo sent at 6/23/2025 12:24 PM EDT -----  Hba1c 8.0  ----- Message -----  From: [facility] In  Sent: 6/19/2025   8:52 PM EDT  To: Pepper Acevedo MD

## 2025-07-23 ENCOUNTER — APPOINTMENT (OUTPATIENT)
Dept: PRIMARY CARE | Facility: CLINIC | Age: 80
End: 2025-07-23
Payer: MEDICARE

## 2025-07-30 DIAGNOSIS — L01.00 IMPETIGO: Primary | ICD-10-CM

## 2025-07-30 RX ORDER — MUPIROCIN 20 MG/G
OINTMENT TOPICAL 3 TIMES DAILY
Qty: 22 G | Refills: 0 | Status: SHIPPED | OUTPATIENT
Start: 2025-07-30 | End: 2025-08-09

## 2025-08-01 PROCEDURE — RXMED WILLOW AMBULATORY MEDICATION CHARGE

## 2025-08-04 ENCOUNTER — PHARMACY VISIT (OUTPATIENT)
Dept: PHARMACY | Facility: CLINIC | Age: 80
End: 2025-08-04
Payer: COMMERCIAL

## 2025-08-11 DIAGNOSIS — I10 PRIMARY HYPERTENSION: ICD-10-CM

## 2025-08-11 RX ORDER — LISINOPRIL 10 MG/1
15 TABLET ORAL 2 TIMES DAILY
Qty: 270 TABLET | Refills: 3 | Status: SHIPPED | OUTPATIENT
Start: 2025-08-11 | End: 2026-08-11

## 2025-08-12 DIAGNOSIS — Z79.4 TYPE 2 DIABETES MELLITUS WITHOUT COMPLICATION, WITH LONG-TERM CURRENT USE OF INSULIN: ICD-10-CM

## 2025-08-12 DIAGNOSIS — E11.9 TYPE 2 DIABETES MELLITUS WITHOUT COMPLICATION, WITH LONG-TERM CURRENT USE OF INSULIN: ICD-10-CM

## 2025-08-12 RX ORDER — INSULIN GLARGINE 100 [IU]/ML
15 INJECTION, SOLUTION SUBCUTANEOUS NIGHTLY
Qty: 4.5 ML | Refills: 11 | Status: SHIPPED | OUTPATIENT
Start: 2025-08-12 | End: 2026-08-12

## 2025-08-15 ENCOUNTER — TELEPHONE (OUTPATIENT)
Dept: PRIMARY CARE | Facility: CLINIC | Age: 80
End: 2025-08-15
Payer: MEDICARE

## 2025-09-30 ENCOUNTER — APPOINTMENT (OUTPATIENT)
Dept: PRIMARY CARE | Facility: CLINIC | Age: 80
End: 2025-09-30
Payer: MEDICARE

## 2026-02-02 ENCOUNTER — APPOINTMENT (OUTPATIENT)
Dept: CARDIOLOGY | Facility: CLINIC | Age: 81
End: 2026-02-02
Payer: MEDICARE

## 2026-02-03 ENCOUNTER — APPOINTMENT (OUTPATIENT)
Dept: CARDIOLOGY | Facility: CLINIC | Age: 81
End: 2026-02-03
Payer: MEDICARE